# Patient Record
Sex: MALE | Race: WHITE | NOT HISPANIC OR LATINO | URBAN - METROPOLITAN AREA
[De-identification: names, ages, dates, MRNs, and addresses within clinical notes are randomized per-mention and may not be internally consistent; named-entity substitution may affect disease eponyms.]

---

## 2017-10-18 ENCOUNTER — MEDICAL CORRESPONDENCE (OUTPATIENT)
Dept: HEALTH INFORMATION MANAGEMENT | Facility: CLINIC | Age: 67
End: 2017-10-18

## 2017-10-24 ENCOUNTER — OFFICE VISIT (OUTPATIENT)
Dept: OPHTHALMOLOGY | Facility: CLINIC | Age: 67
End: 2017-10-24
Attending: OPHTHALMOLOGY
Payer: COMMERCIAL

## 2017-10-24 DIAGNOSIS — H25.13 NUCLEAR SENILE CATARACT OF BOTH EYES: ICD-10-CM

## 2017-10-24 DIAGNOSIS — H17.9 CORNEAL SCAR: Primary | ICD-10-CM

## 2017-10-24 PROCEDURE — 92025 CPTRIZED CORNEAL TOPOGRAPHY: CPT | Mod: ZF | Performed by: OPHTHALMOLOGY

## 2017-10-24 PROCEDURE — 99213 OFFICE O/P EST LOW 20 MIN: CPT | Mod: 25,ZF

## 2017-10-24 RX ORDER — TAMSULOSIN HYDROCHLORIDE 0.4 MG/1
1 CAPSULE ORAL DAILY
COMMUNITY

## 2017-10-24 ASSESSMENT — SLIT LAMP EXAM - LIDS
COMMENTS: NORMAL
COMMENTS: NORMAL

## 2017-10-24 ASSESSMENT — VISUAL ACUITY
OS_PH_CC: 20/30
CORRECTION_TYPE: GLASSES
OD_CC: 20/20
OS_CC: 20/40
OD_CC+: +2
METHOD: SNELLEN - LINEAR

## 2017-10-24 ASSESSMENT — REFRACTION_WEARINGRX
OS_AXIS: 039
OS_ADD: +2.50
OD_AXIS: 148
OS_CYLINDER: +1.00
SPECS_TYPE: PAL
OD_SPHERE: +0.50
OD_ADD: +2.50
OD_CYLINDER: +0.25
OS_SPHERE: -0.75

## 2017-10-24 ASSESSMENT — TONOMETRY
OD_IOP_MMHG: 20
IOP_METHOD: ICARE
OS_IOP_MMHG: 17

## 2017-10-24 ASSESSMENT — CONF VISUAL FIELD
METHOD: COUNTING FINGERS
OS_NORMAL: 1
OD_NORMAL: 1

## 2017-10-24 ASSESSMENT — EXTERNAL EXAM - LEFT EYE: OS_EXAM: NORMAL

## 2017-10-24 ASSESSMENT — EXTERNAL EXAM - RIGHT EYE: OD_EXAM: NORMAL

## 2017-10-24 NOTE — PROGRESS NOTES
A 66 year M    [facility]    history of getting something in left eye while working in yard 10 yrs prior. He rubbed and rinsed the eye and was diagnosed with a worsening abrasion and subsequent corneal scar. Reports OS was his better eye as OD has astigmatism.    Feels as if he is looking through a dirty window in OS. Constant photophobia. Glare while driving at night. Denies pain, flashes, floaters.    Glasses 2 weeks old.    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     Patient reluctant but willing to to try contact lenses.     Will schedule an appointment with Dr. Ramona SERRATO FOUR TIMES A DAY    Superficial corneal opacity OD  Not visually significant  Can try contact lens in OD as well    Cataract both eyes  Not visually significant, monitor      KAELA Laureano  Cornea fellow        ~~~~~~~~~~~~~~~~~~~~~~~~~~~~~~~~~~~~~~~~~~~~~~~~~~~~~~~~~~~~~~~~    Complete documentation of historical and exam elements from today's encounter can be found in the full encounter summary report (not reduplicated in this progress note). I personally obtained the chief complaint(s) and history of present illness.  I confirmed and edited as necessary the review of systems, past medical/surgical history, family history, social history, and examination findings as documented by others.  I examined the patient myself, and I personally reviewed the relevant tests, images, and reports as documented above. I formulated and edited as necessary the assessment and plan and discussed the findings and management plan with the patient and family.     I personally interpreted the diagnostic /  imaging study and have edited the interpretation as needed.    Tonny Gusman MD, MA  Director, Cornea & Anterior Segment  [facility]

## 2017-10-24 NOTE — MR AVS SNAPSHOT
After Visit Summary   10/24/2017    Luiz Maxwell    MRN: [id number]           Patient Information     Date Of Birth          1950        Visit Information        Provider Department      10/24/2017 12:45 PM Tonny Gusman MD [facility]        Today's Diagnoses     Corneal scar    -  1    Nuclear senile cataract of both eyes           Follow-ups after your visit        Follow-up notes from your care team     Return for Follow Up.      Who to contact     Please call your clinic at [phone] to:    Ask questions about your health    Make or cancel appointments    Discuss your medicines    Learn about your test results    Speak to your doctor   If you have compliments or concerns about an experience at your clinic, or if you wish to file a complaint, please contact [facility] Patient Relations at [phone] or email us at [email or web address]         Additional Information About Your Visit        MyChart Information     Vintt is an electronic gateway that provides easy, online access to your medical records. With Options Away, you can request a clinic appointment, read your test results, renew a prescription or communicate with your care team.     To sign up for Vintt visit the website at [email or web address]   You will be asked to enter the access code listed below, as well as some personal information. Please follow the directions to create your username and password.     Your access code is: [email or web address]  Expires: 2018  6:31 AM     Your access code will  in 90 days. If you need help or a new code, please contact your [facility] or call [phone] for assistance.        Care EveryWhere ID     This is your Care EveryWhere ID. This could be used by other organizations to access your [place] medical records  [id number]         Blood Pressure from Last 3 Encounters:   No data found for BP    Weight  from Last 3 Encounters:   No data found for Wt              We Performed the Following     Corneal Topography OU (both eyes)        Primary Care Provider Fax #    [facility] [phone]       [address] [facility] [address]        Equal Access to Services     RICHARD SUE: Hadii tiffanie ku haderico Soomaali, waaxda luqadaha, qaybta kaalmada adeegyada, grabiel calderonglenn hinklemahoganybetty sue. So [facility] [phone].    ATENCIÓN: Si habla español, tiene a brink disposición servicios gratuitos de asistencia lingüística. Llame al [phone].    We comply with applicable federal civil rights laws and [place] laws. We do not discriminate on the basis of race, color, national origin, age, disability, sex, sexual orientation, or gender identity.            Thank you!     Thank you for choosing EYE CLINIC  for your care. Our goal is always to provide you with excellent care. Hearing back from our patients is one way we can continue to improve our services. Please take a few minutes to complete the written survey that you may receive in the mail after your visit with us. Thank you!             Your Updated Medication List - Protect others around you: Learn how to safely use, store and throw away your medicines at www.disposemymeds.org.          This list is accurate as of: 10/24/17  2:26 PM.  Always use your most recent med list.                   Brand Name Dispense Instructions for use Diagnosis    tamsulosin 0.4 MG capsule    FLOMAX     Take 1 capsule by mouth daily

## 2017-10-24 NOTE — NURSING NOTE
Chief Complaints and History of Present Illnesses   Patient presents with     Corneal Evaluation     Corneal Scarring both eyes.     HPI    Affected eye(s):  Both   Symptoms:     Floaters (Comment: Large floater in RE for 3 years, no changes.)   No flashes   No redness   No Dryness         Do you have eye pain now?:  No      Comments:  Pt states that he had a corneal abrasion in his LE in 2009. Pt states that his vision has not been clear since he had the abrasion. Pt got new glasses 1 week ago but is having difficulty seeing clearly even with glasses.    Amanda GREGG October 24, 2017 12:06 PM

## 2017-10-25 ENCOUNTER — OFFICE VISIT (OUTPATIENT)
Dept: OPTOMETRY | Facility: CLINIC | Age: 67
End: 2017-10-25

## 2017-10-25 DIAGNOSIS — H53.71 GLARE SENSITIVITY: ICD-10-CM

## 2017-10-25 DIAGNOSIS — H52.212 IRREGULAR ASTIGMATISM OF LEFT EYE: Primary | ICD-10-CM

## 2017-10-25 DIAGNOSIS — H17.12 CORNEAL OPACITY, CENTRAL, LEFT: ICD-10-CM

## 2017-10-25 ASSESSMENT — REFRACTION_WEARINGRX
SPECS_TYPE: PAL
OD_SPHERE: +0.50
OD_CYLINDER: +0.25
OD_ADD: +2.50
OS_SPHERE: -0.75
OS_ADD: +2.50
OD_AXIS: 148
OS_CYLINDER: +1.00
OS_AXIS: 039

## 2017-10-25 ASSESSMENT — REFRACTION_CURRENTRX
OS_BRAND: ROSE K2 IC
OD_BRAND: ROSE K2 IC
OS_SPHERE: -1.00
OS_DIAMETER: 11.2
OS_BASECURVE: 41.00
OD_BASECURVE: 40.00
OD_SPHERE: -1.00
OD_DIAMETER: 11.2

## 2017-10-25 ASSESSMENT — SLIT LAMP EXAM - LIDS
COMMENTS: NORMAL
COMMENTS: NORMAL

## 2017-10-25 ASSESSMENT — VISUAL ACUITY
OD_CC: 20/20
METHOD: SNELLEN - LINEAR
CORRECTION_TYPE: GLASSES
OS_CC: 20/30-1

## 2017-10-25 ASSESSMENT — EXTERNAL EXAM - RIGHT EYE: OD_EXAM: NORMAL

## 2017-10-25 ASSESSMENT — EXTERNAL EXAM - LEFT EYE: OS_EXAM: NORMAL

## 2017-10-25 NOTE — MR AVS SNAPSHOT
After Visit Summary   10/25/2017    Luiz Maxwell    MRN: [id number]           Patient Information     Date Of Birth          1950        Visit Information        Provider Department      10/25/2017 11:00 AM Michelle Greene OD [facility]         Follow-ups after your visit        Your next 10 appointments already scheduled     2017 10:30 AM CST   Return Visit with Michelle Greene OD   Eye Clinic ([facility])    [facility]  Fl  Clinic [address]a  [address] [place] [address]   [phone]              Who to contact     Please call your clinic at [phone] to:    Ask questions about your health    Make or cancel appointments    Discuss your medicines    Learn about your test results    Speak to your doctor   If you have compliments or concerns about an experience at your clinic, or if you wish to file a complaint, please contact [facility] Patient Relations at [phone] or email us at [email or web address]         Additional Information About Your Visit        MyChart Information     ascentify is an electronic gateway that provides easy, online access to your medical records. With ascentify, you can request a clinic appointment, read your test results, renew a prescription or communicate with your care team.     To sign up for ascentify visit the website at [email or web address]   You will be asked to enter the access code listed below, as well as some personal information. Please follow the directions to create your username and password.     Your access code is: [email or web address]  Expires: 2018  6:31 AM     Your access code will  in 90 days. If you need help or a new code, please contact your [facility] or call [phone] for assistance.        Care EveryWhere ID     This is your Care EveryWhere ID. This could be used by other organizations to access your  [place] medical records  [id number]         Blood Pressure from Last 3 Encounters:   No data found for BP    Weight from Last 3 Encounters:   No data found for Wt              Today, you had the following     No orders found for display       Primary Care Provider Fax #    [facility] [phone]       [address] [facility] [address]        Equal Access to Services     RICHARD THAO : Hadii aad ku hadasho Soomaali, waaxda luqadaha, qaybta kaalmada adeegyada, waxay idiin hayaan adeeg kharash lazulayn . So [facility] [phone].    ATENCIÓN: Si habla español, tiene a brink disposición servicios gratuitos de asistencia lingüística. Llame al [phone].    We comply with applicable federal civil rights laws and [place] laws. We do not discriminate on the basis of race, color, national origin, age, disability, sex, sexual orientation, or gender identity.            Thank you!     Thank you for choosing EYE CLINIC  for your care. Our goal is always to provide you with excellent care. Hearing back from our patients is one way we can continue to improve our services. Please take a few minutes to complete the written survey that you may receive in the mail after your visit with us. Thank you!             Your Updated Medication List - Protect others around you: Learn how to safely use, store and throw away your medicines at www.disposemymeds.org.          This list is accurate as of: 10/25/17 11:22 AM.  Always use your most recent med list.                   Brand Name Dispense Instructions for use Diagnosis    tamsulosin 0.4 MG capsule    FLOMAX     Take 1 capsule by mouth daily

## 2017-10-25 NOTE — PROGRESS NOTES
A/P  1.) Irregular astigmatism OS 2' to corneal scar  -Improvement in vision/glare with hard lens OS  -Mild astigmatism OD, reading acuity improves with hard lens  -Reviewed findings with pt including lens adaptation and need for OTC readers afterwards  -He would like to proceed. Order lenses OU    RTC 2 weeks for lens dispense, I&R    Contact Lens Billing  V-Code:  - GP Spherical  Final Contact Lens Rx      Brand Base Curve Diameter Sphere Lens   Right Leena K2 IC 8.44 10.0 +1.75 Opt Extra blue dot, Hydra-PEG   Left Leena K2 IC 8.44 10.0 +1.50 Opt Extra blue, Hydra-PEG            # of units: 2  Price per Unit: $150    This patient requires contact lenses that are medically necessary for either improvement in vision over spectacles, support of the ocular surface, or other therapeutic benefit. These are not cosmetic contact lenses.  Photophobia/glare sensitivity    Encounter Diagnoses   Name Primary?     Irregular astigmatism of left eye Yes     Glare sensitivity      Corneal opacity, central, left

## 2017-11-06 ENCOUNTER — OFFICE VISIT (OUTPATIENT)
Dept: OPTOMETRY | Facility: CLINIC | Age: 67
End: 2017-11-06

## 2017-11-06 DIAGNOSIS — H53.71 GLARE SENSITIVITY: ICD-10-CM

## 2017-11-06 DIAGNOSIS — H17.12 CORNEAL OPACITY, CENTRAL, LEFT: ICD-10-CM

## 2017-11-06 DIAGNOSIS — H52.213 IRREGULAR ASTIGMATISM OF BOTH EYES: Primary | ICD-10-CM

## 2017-11-06 RX ORDER — GAS PERM. LENS SOAKING SOLN
1 SOLUTION, NON-ORAL MISCELLANEOUS DAILY
Qty: 1 BOTTLE | Refills: 11 | Status: SHIPPED | OUTPATIENT
Start: 2017-11-06

## 2017-11-06 ASSESSMENT — VISUAL ACUITY
METHOD: SNELLEN - LINEAR
OS_SC: 20/40+1
OD_SC: 20/20-2

## 2017-11-06 ASSESSMENT — EXTERNAL EXAM - LEFT EYE: OS_EXAM: NORMAL

## 2017-11-06 ASSESSMENT — REFRACTION_CURRENTRX
OD_SPHERE: +1.75
OS_SPHERE: +1.50
OD_BASECURVE: 8.44
OD_BRAND: ROSE K2 IC
OS_BASECURVE: 8.44
OS_DIAMETER: 10.0
OD_DIAMETER: 10.0
OS_BRAND: ROSE K2 IC

## 2017-11-06 ASSESSMENT — SLIT LAMP EXAM - LIDS
COMMENTS: NORMAL
COMMENTS: NORMAL

## 2017-11-06 ASSESSMENT — EXTERNAL EXAM - RIGHT EYE: OD_EXAM: NORMAL

## 2017-11-06 NOTE — PROGRESS NOTES
A/P  1.) Irregular astigmatism OS 2' to corneal scar  -Improvement in vision/glare with hard lens OS  -Mild astigmatism OD, reading acuity improves with hard lens  -Significant lid attachment causing fluctuating vision with GP's  -Tear film likely to be limitation despite Hydra-PEG  -Successful I&R, reviewed CL care and hygiene with pt ([place] [facility])    RTC 2 week f/u, will likely need larger diam GP's

## 2017-11-06 NOTE — MR AVS SNAPSHOT
After Visit Summary   2017    Luiz Maxwell    MRN: [id number]           Patient Information     Date Of Birth          1950        Visit Information        Provider Department      2017 10:30 AM Michelle Greene, [facility]        Today's Diagnoses     Irregular astigmatism of both eyes    -  1       Follow-ups after your visit        Your next 10 appointments already scheduled     2017  7:30 AM CST   Return Visit with Michelle Greene OD   Eye Clinic ([facility])    [facility]  Fl  Clinic [address]a  [address] [facility] [address]   [phone]              Who to contact     Please call your clinic at [phone] to:    Ask questions about your health    Make or cancel appointments    Discuss your medicines    Learn about your test results    Speak to your doctor   If you have compliments or concerns about an experience at your clinic, or if you wish to file a complaint, please contact [facility] Patient Relations at [phone] or email us at [email or web address]         Additional Information About Your Visit        MyChart Information     [employer] is an electronic gateway that provides easy, online access to your medical records. With [employer], you can request a clinic appointment, read your test results, renew a prescription or communicate with your care team.     To sign up for [employer] visit the website at [email or web address]   You will be asked to enter the access code listed below, as well as some personal information. Please follow the directions to create your username and password.     Your access code is: [email or web address]  Expires: 2018  5:31 AM     Your access code will  in 90 days. If you need help or a new code, please contact your [facility] or call [phone] for assistance.        Care EveryWhere ID     This is your Care  EveryWhere ID. This could be used by other organizations to access your [place] medical records  [id number]         Blood Pressure from Last 3 Encounters:   No data found for BP    Weight from Last 3 Encounters:   No data found for Wt              Today, you had the following     No orders found for display         Today's Medication Changes          These changes are accurate as of: 11/6/17 10:49 AM.  If you have any questions, ask your nurse or doctor.               Start taking these medicines.        Dose/Directions    JEFFRY SIMPLUS Soln   Used for:  Irregular astigmatism of both eyes        Dose:  1 Application   1 Application daily   Quantity:  1 Bottle   Refills:  11            Where to get your medicines      Some of these will need a paper prescription and others can be bought over the counter.  Ask your nurse if you have questions.     Bring a paper prescription for each of these medications     BOSTON SIMPLUS Soln                Primary Care Provider Fax #    [facility] [phone]       [address] [facility] [address]        Equal Access to Services     RICHARD THAO : Hadii aad ku hadasho Sokei, waaxda luqadaha, qaybta kaalmada adeegyada, grabiel ramachandran hayalyse de la fuente . So [facility] [phone].    ATENCIÓN: Si habla español, tiene a brink disposición servicios gratuitos de asistencia lingüística. Llame al [phone].    We comply with applicable federal civil rights laws and [place] laws. We do not discriminate on the basis of race, color, national origin, age, disability, sex, sexual orientation, or gender identity.            Thank you!     Thank you for choosing EYE CLINIC  for your care. Our goal is always to provide you with excellent care. Hearing back from our patients is one way we can continue to improve our services. Please take a few minutes to complete the written survey that you may receive in the mail after your visit with us. Thank you!              Your Updated Medication List - Protect others around you: Learn how to safely use, store and throw away your medicines at www.disposemymeds.org.          This list is accurate as of: 11/6/17 10:49 AM.  Always use your most recent med list.                   Brand Name Dispense Instructions for use Diagnosis    BOSTON SIMPLUS Soln     1 Bottle    1 Application daily    Irregular astigmatism of both eyes       tamsulosin 0.4 MG capsule    FLOMAX     Take 1 capsule by mouth daily

## 2022-04-08 ENCOUNTER — APPOINTMENT (OUTPATIENT)
Dept: RADIOLOGY | Facility: MEDICAL CENTER | Age: 72
End: 2022-04-08
Attending: EMERGENCY MEDICINE
Payer: MEDICARE

## 2022-04-08 ENCOUNTER — HOSPITAL ENCOUNTER (EMERGENCY)
Facility: MEDICAL CENTER | Age: 72
End: 2022-04-09
Attending: EMERGENCY MEDICINE
Payer: MEDICARE

## 2022-04-08 DIAGNOSIS — R07.9 CHEST PAIN, UNSPECIFIED TYPE: ICD-10-CM

## 2022-04-08 DIAGNOSIS — R45.851 SUICIDAL IDEATION: ICD-10-CM

## 2022-04-08 DIAGNOSIS — J44.9 CHRONIC OBSTRUCTIVE PULMONARY DISEASE, UNSPECIFIED COPD TYPE (HCC): ICD-10-CM

## 2022-04-08 LAB
ALBUMIN SERPL BCP-MCNC: 3.8 G/DL (ref 3.2–4.9)
ALBUMIN/GLOB SERPL: 1.4 G/DL
ALP SERPL-CCNC: 116 U/L (ref 30–99)
ALT SERPL-CCNC: 20 U/L (ref 2–50)
AMPHET UR QL SCN: NEGATIVE
ANION GAP SERPL CALC-SCNC: 14 MMOL/L (ref 7–16)
AST SERPL-CCNC: 35 U/L (ref 12–45)
BARBITURATES UR QL SCN: NEGATIVE
BASOPHILS # BLD AUTO: 0.4 % (ref 0–1.8)
BASOPHILS # BLD: 0.03 K/UL (ref 0–0.12)
BENZODIAZ UR QL SCN: POSITIVE
BILIRUB SERPL-MCNC: 0.5 MG/DL (ref 0.1–1.5)
BUN SERPL-MCNC: 6 MG/DL (ref 8–22)
BZE UR QL SCN: NEGATIVE
CALCIUM SERPL-MCNC: 8.7 MG/DL (ref 8.5–10.5)
CANNABINOIDS UR QL SCN: NEGATIVE
CHLORIDE SERPL-SCNC: 102 MMOL/L (ref 96–112)
CO2 SERPL-SCNC: 22 MMOL/L (ref 20–33)
CREAT SERPL-MCNC: 0.57 MG/DL (ref 0.5–1.4)
D DIMER PPP IA.FEU-MCNC: 2.83 UG/ML (FEU) (ref 0–0.5)
EKG IMPRESSION: NORMAL
EKG IMPRESSION: NORMAL
EOSINOPHIL # BLD AUTO: 0.25 K/UL (ref 0–0.51)
EOSINOPHIL NFR BLD: 3 % (ref 0–6.9)
ERYTHROCYTE [DISTWIDTH] IN BLOOD BY AUTOMATED COUNT: 51.6 FL (ref 35.9–50)
ETHANOL BLD-MCNC: 35.5 MG/DL (ref 0–10)
GFR SERPLBLD CREATININE-BSD FMLA CKD-EPI: 106 ML/MIN/1.73 M 2
GLOBULIN SER CALC-MCNC: 2.7 G/DL (ref 1.9–3.5)
GLUCOSE SERPL-MCNC: 84 MG/DL (ref 65–99)
HCT VFR BLD AUTO: 35.7 % (ref 42–52)
HGB BLD-MCNC: 12 G/DL (ref 14–18)
IMM GRANULOCYTES # BLD AUTO: 0.02 K/UL (ref 0–0.11)
IMM GRANULOCYTES NFR BLD AUTO: 0.2 % (ref 0–0.9)
LACTATE BLD-SCNC: 1.8 MMOL/L (ref 0.5–2)
LACTATE BLD-SCNC: 2.2 MMOL/L (ref 0.5–2)
LYMPHOCYTES # BLD AUTO: 0.95 K/UL (ref 1–4.8)
LYMPHOCYTES NFR BLD: 11.2 % (ref 22–41)
MCH RBC QN AUTO: 31.3 PG (ref 27–33)
MCHC RBC AUTO-ENTMCNC: 33.6 G/DL (ref 33.7–35.3)
MCV RBC AUTO: 93 FL (ref 81.4–97.8)
METHADONE UR QL SCN: NEGATIVE
MONOCYTES # BLD AUTO: 0.67 K/UL (ref 0–0.85)
MONOCYTES NFR BLD AUTO: 7.9 % (ref 0–13.4)
NEUTROPHILS # BLD AUTO: 6.53 K/UL (ref 1.82–7.42)
NEUTROPHILS NFR BLD: 77.3 % (ref 44–72)
NRBC # BLD AUTO: 0 K/UL
NRBC BLD-RTO: 0 /100 WBC
OPIATES UR QL SCN: NEGATIVE
OXYCODONE UR QL SCN: NEGATIVE
PCP UR QL SCN: NEGATIVE
PLATELET # BLD AUTO: 179 K/UL (ref 164–446)
PMV BLD AUTO: 9.3 FL (ref 9–12.9)
POC BREATHALIZER: 0 PERCENT (ref 0–0.01)
POTASSIUM SERPL-SCNC: 4 MMOL/L (ref 3.6–5.5)
PROCALCITONIN SERPL-MCNC: <0.05 NG/ML
PROPOXYPH UR QL SCN: NEGATIVE
PROT SERPL-MCNC: 6.5 G/DL (ref 6–8.2)
RBC # BLD AUTO: 3.84 M/UL (ref 4.7–6.1)
SODIUM SERPL-SCNC: 138 MMOL/L (ref 135–145)
TROPONIN T SERPL-MCNC: 11 NG/L (ref 6–19)
TROPONIN T SERPL-MCNC: 9 NG/L (ref 6–19)
WBC # BLD AUTO: 8.5 K/UL (ref 4.8–10.8)

## 2022-04-08 PROCEDURE — 87040 BLOOD CULTURE FOR BACTERIA: CPT | Mod: 91

## 2022-04-08 PROCEDURE — 99285 EMERGENCY DEPT VISIT HI MDM: CPT

## 2022-04-08 PROCEDURE — 83605 ASSAY OF LACTIC ACID: CPT

## 2022-04-08 PROCEDURE — A9270 NON-COVERED ITEM OR SERVICE: HCPCS | Performed by: EMERGENCY MEDICINE

## 2022-04-08 PROCEDURE — 700102 HCHG RX REV CODE 250 W/ 637 OVERRIDE(OP): Performed by: EMERGENCY MEDICINE

## 2022-04-08 PROCEDURE — 700117 HCHG RX CONTRAST REV CODE 255: Performed by: EMERGENCY MEDICINE

## 2022-04-08 PROCEDURE — 84145 PROCALCITONIN (PCT): CPT

## 2022-04-08 PROCEDURE — 80053 COMPREHEN METABOLIC PANEL: CPT

## 2022-04-08 PROCEDURE — 82077 ASSAY SPEC XCP UR&BREATH IA: CPT

## 2022-04-08 PROCEDURE — 71275 CT ANGIOGRAPHY CHEST: CPT | Mod: ME

## 2022-04-08 PROCEDURE — 84484 ASSAY OF TROPONIN QUANT: CPT

## 2022-04-08 PROCEDURE — 80307 DRUG TEST PRSMV CHEM ANLYZR: CPT

## 2022-04-08 PROCEDURE — 93005 ELECTROCARDIOGRAM TRACING: CPT

## 2022-04-08 PROCEDURE — 85025 COMPLETE CBC W/AUTO DIFF WBC: CPT

## 2022-04-08 PROCEDURE — 85379 FIBRIN DEGRADATION QUANT: CPT

## 2022-04-08 PROCEDURE — 36415 COLL VENOUS BLD VENIPUNCTURE: CPT

## 2022-04-08 PROCEDURE — 302970 POC BREATHALIZER: Performed by: EMERGENCY MEDICINE

## 2022-04-08 PROCEDURE — 96374 THER/PROPH/DIAG INJ IV PUSH: CPT | Mod: XU

## 2022-04-08 PROCEDURE — 93005 ELECTROCARDIOGRAM TRACING: CPT | Performed by: EMERGENCY MEDICINE

## 2022-04-08 PROCEDURE — 700111 HCHG RX REV CODE 636 W/ 250 OVERRIDE (IP): Performed by: EMERGENCY MEDICINE

## 2022-04-08 PROCEDURE — 71045 X-RAY EXAM CHEST 1 VIEW: CPT

## 2022-04-08 PROCEDURE — 90791 PSYCH DIAGNOSTIC EVALUATION: CPT

## 2022-04-08 RX ORDER — ALBUTEROL SULFATE 90 UG/1
2 AEROSOL, METERED RESPIRATORY (INHALATION) EVERY 4 HOURS PRN
Status: DISCONTINUED | OUTPATIENT
Start: 2022-04-08 | End: 2022-04-09 | Stop reason: HOSPADM

## 2022-04-08 RX ORDER — TRAZODONE HYDROCHLORIDE 50 MG/1
50 TABLET ORAL
Status: DISCONTINUED | OUTPATIENT
Start: 2022-04-08 | End: 2022-04-09 | Stop reason: HOSPADM

## 2022-04-08 RX ORDER — CHOLECALCIFEROL (VITAMIN D3) 125 MCG
5 CAPSULE ORAL NIGHTLY
Status: DISCONTINUED | OUTPATIENT
Start: 2022-04-08 | End: 2022-04-09 | Stop reason: HOSPADM

## 2022-04-08 RX ORDER — PREDNISONE 20 MG/1
60 TABLET ORAL DAILY
Status: DISCONTINUED | OUTPATIENT
Start: 2022-04-08 | End: 2022-04-09 | Stop reason: HOSPADM

## 2022-04-08 RX ADMIN — ALBUTEROL SULFATE 2 PUFF: 90 AEROSOL, METERED RESPIRATORY (INHALATION) at 14:05

## 2022-04-08 RX ADMIN — TRAZODONE HYDROCHLORIDE 50 MG: 50 TABLET ORAL at 20:52

## 2022-04-08 RX ADMIN — Medication 5 MG: at 20:52

## 2022-04-08 RX ADMIN — IOHEXOL 75 ML: 350 INJECTION, SOLUTION INTRAVENOUS at 11:41

## 2022-04-08 RX ADMIN — PREDNISONE 60 MG: 20 TABLET ORAL at 14:04

## 2022-04-08 RX ADMIN — FENTANYL CITRATE 50 MCG: 50 INJECTION, SOLUTION INTRAMUSCULAR; INTRAVENOUS at 09:39

## 2022-04-08 NOTE — ED PROVIDER NOTES
"ED Provider Note    ER PROVIDER NOTE          CHIEF COMPLAINT  Chief Complaint   Patient presents with   • Chest Pain     CP Started about an hour ago. Sharp pain from r lower side of chest to left side. Per pt \"left arm went numb\". Pain worsens during inspiration.       HPI  Mohsen Adler is a 71 y.o. male who presents to the emergency department complaining of chest pain.  Patient reports that he has had gradual onset of left-sided chest pain around 1 hour ago while he was walking and \"looking for a bathroom\".  The pain is sharp, does not radiate, it is worse with a deep breath but does not seem to change with exertion or position.  It is currently mild in nature.  He does state that he had some tingling in his arm as well although this is gone.  He does report some cough as well as shortness of breath over the last few days.  States he took a Covid test that was negative.  He reports no fevers or chills.  No leg pain or swelling.  No abdominal pain, nausea, vomiting or diaphoresis    REVIEW OF SYSTEMS  Pertinent positives include chest pain. Pertinent negatives include no fever. See HPI for details. All other systems reviewed and are negative.    PAST MEDICAL HISTORY   has a past medical history of Chronic obstructive pulmonary disease (HCC).    SURGICAL HISTORY  patient denies any surgical history    FAMILY HISTORY  History reviewed. No pertinent family history.    SOCIAL HISTORY  Social History     Socioeconomic History   • Marital status: Single   Tobacco Use   • Smoking status: Current Every Day Smoker     Packs/day: 0.50     Types: Cigarettes   • Smokeless tobacco: Never Used   Vaping Use   • Vaping Use: Never used   Substance and Sexual Activity   • Alcohol use: Yes     Comment: 1 pint of vodka per day   • Drug use: Never      Social History     Substance and Sexual Activity   Drug Use Never       CURRENT MEDICATIONS  Home Medications     Reviewed by Hany Benitez R.N. (Registered Nurse) on 04/08/22 at " "0750  Med List Status: Partial   Medication Last Dose Status        Patient Nehemiah Taking any Medications                       ALLERGIES  Allergies   Allergen Reactions   • Seroquel [Quetiapine]        PHYSICAL EXAM  VITAL SIGNS: /72   Pulse (!) 57   Temp 37.7 °C (99.8 °F) (Temporal)   Resp 15   Ht 1.753 m (5' 9\")   Wt 81.6 kg (180 lb)   SpO2 91%   BMI 26.58 kg/m²   Pulse ox interpretation: I interpret this pulse ox as normal.    Constitutional: Alert in no apparent distress.  HENT: No signs of trauma, Bilateral external ears normal, Nose normal.   Eyes: Pupils are equal and reactive, Conjunctiva normal, Non-icteric.   Neck: Normal range of motion, No tenderness, Supple, No stridor.    Cardiovascular: Regular rate and rhythm, no murmurs.   Thorax & Lungs: Scattered wheezing, No respiratory distress,  left sided chest wall tenderness  Abdomen: Bowel sounds normal, Soft, No tenderness, No masses, No pulsatile masses. No peritoneal signs.  Skin: Warm, Dry, No erythema, No rash.   Back: No bony tenderness, No CVA tenderness.   Extremities: Intact distal pulses, No edema, No tenderness, No cyanosis, Negative Kiki's sign.  Musculoskeletal: Good range of motion in all major joints. No tenderness to palpation or major deformities noted.   Neurologic: Alert , Normal motor function, Normal sensory function, No focal deficits noted.   Psychiatric: Affect normal, Judgment normal, Mood normal.     DIAGNOSTIC STUDIES / PROCEDURES    Results for orders placed or performed during the hospital encounter of 04/08/22   CBC WITH DIFFERENTIAL   Result Value Ref Range    WBC 8.5 4.8 - 10.8 K/uL    RBC 3.84 (L) 4.70 - 6.10 M/uL    Hemoglobin 12.0 (L) 14.0 - 18.0 g/dL    Hematocrit 35.7 (L) 42.0 - 52.0 %    MCV 93.0 81.4 - 97.8 fL    MCH 31.3 27.0 - 33.0 pg    MCHC 33.6 (L) 33.7 - 35.3 g/dL    RDW 51.6 (H) 35.9 - 50.0 fL    Platelet Count 179 164 - 446 K/uL    MPV 9.3 9.0 - 12.9 fL    Neutrophils-Polys 77.30 (H) 44.00 - 72.00 " %    Lymphocytes 11.20 (L) 22.00 - 41.00 %    Monocytes 7.90 0.00 - 13.40 %    Eosinophils 3.00 0.00 - 6.90 %    Basophils 0.40 0.00 - 1.80 %    Immature Granulocytes 0.20 0.00 - 0.90 %    Nucleated RBC 0.00 /100 WBC    Neutrophils (Absolute) 6.53 1.82 - 7.42 K/uL    Lymphs (Absolute) 0.95 (L) 1.00 - 4.80 K/uL    Monos (Absolute) 0.67 0.00 - 0.85 K/uL    Eos (Absolute) 0.25 0.00 - 0.51 K/uL    Baso (Absolute) 0.03 0.00 - 0.12 K/uL    Immature Granulocytes (abs) 0.02 0.00 - 0.11 K/uL    NRBC (Absolute) 0.00 K/uL   COMP METABOLIC PANEL   Result Value Ref Range    Sodium 138 135 - 145 mmol/L    Potassium 4.0 3.6 - 5.5 mmol/L    Chloride 102 96 - 112 mmol/L    Co2 22 20 - 33 mmol/L    Anion Gap 14.0 7.0 - 16.0    Glucose 84 65 - 99 mg/dL    Bun 6 (L) 8 - 22 mg/dL    Creatinine 0.57 0.50 - 1.40 mg/dL    Calcium 8.7 8.5 - 10.5 mg/dL    AST(SGOT) 35 12 - 45 U/L    ALT(SGPT) 20 2 - 50 U/L    Alkaline Phosphatase 116 (H) 30 - 99 U/L    Total Bilirubin 0.5 0.1 - 1.5 mg/dL    Albumin 3.8 3.2 - 4.9 g/dL    Total Protein 6.5 6.0 - 8.2 g/dL    Globulin 2.7 1.9 - 3.5 g/dL    A-G Ratio 1.4 g/dL   TROPONIN   Result Value Ref Range    Troponin T 11 6 - 19 ng/L   D-DIMER   Result Value Ref Range    D-Dimer Screen 2.83 (H) 0.00 - 0.50 ug/mL (FEU)   Lactic Acid   Result Value Ref Range    Lactic Acid 2.2 (H) 0.5 - 2.0 mmol/L   Lactic Acid   Result Value Ref Range    Lactic Acid 1.8 0.5 - 2.0 mmol/L   Procalcitonin   Result Value Ref Range    Procalcitonin <0.05 <0.25 ng/mL   ESTIMATED GFR   Result Value Ref Range    GFR (CKD-EPI) 106 >60 mL/min/1.73 m 2   TROPONIN   Result Value Ref Range    Troponin T 9 6 - 19 ng/L   EKG   Result Value Ref Range    Report       [facility]    Test Date:  2022  Pt Name:    ADRIEN FIGUEROA               Department: ER  MRN:        [id number]                      Room:        11  Gender:     Male                         Technician: [id number]  :        1950                    Requested By:ER TRIAGE PROTOCOL  Order #:    [id number]                    Reading MD: FRANSISCA JENSEN MD    Measurements  Intervals                                Axis  Rate:       82                           P:          58  OR:         142                          QRS:        62  QRSD:       84                           T:          65  QT:         371  QTc:        434    Interpretive Statements  Sinus rhythm  Borderline ST elevation, inferior leads  No previous ECG available for comparison  Electronically Signed On 2022 7:49:28 PDT by FRANSISCA JENSEN MD     EKG (Now)   Result Value Ref Range    Report       [facility] Emergency Dept.    Test Date:  2022  Pt Name:    ADRIEN FIGUEROA               Department: ER  MRN:        [id number]                      Room:        11  Gender:     Male                         Technician: [id number]  :        1950                   Requested By:FRANSISCA JENSEN  Order #:    [id number]                    Reading MD: FRANSISCA JENSEN MD    Measurements  Intervals                                Axis  Rate:       51                           P:          37  OR:         197                          QRS:        55  QRSD:       89                           T:          59  QT:         394  QTc:        363    Interpretive Statements  Sinus bradycardia  Minimal ST elevation, inferior leads  Compared to ECG 2022 07:43:40  Sinus rhythm no longer present  ST (T wave) deviation still present  Electronically Signed On 2022 10:23:14 PDT by FRANSISCA JENSEN MD           RADIOLOGY  CT-CTA CHEST PULMONARY ARTERY W/ RECONS   Final Result      1.  No CT evidence for pulmonary emboli.   2.  Minimal RIGHT pleural fluid.   3.  Bilateral dependent atelectasis.            DX-CHEST-PORTABLE (1 VIEW)   Final Result      1.  Mild-to-moderate elevation left hemidiaphragm.      2.  Diffuse interstitial parenchymal scarring.      3.  Parenchymal scar versus  atelectasis left midlung.        The radiologist's interpretation of all radiological studies have been reviewed and images independently viewed by me.    COURSE & MEDICAL DECISION MAKING  Nursing notes, VS, PMSFHx reviewed in chart.    7:48 AM Patient seen and examined at bedside. Ordered for ECG, CBC, CMP, troponin, dimer, procalcitonin and x-ray to evaluate his symptoms.     Plan for repeat/serial ECG as well     9:15 AM  Patient is reevaluated, updated on results thus far, pending CTA    11:45 AM patient has returned from CT, pending results, he is resting comfortably    12:05 PM  Patient is reevaluated, updated on results of CTA.  Patient reports that he is feeling suicidal.  States that he will cut his femoral artery to kill himself once he leaves the hospital    We will initiate legal hold, patient is admitted to ED observation at 12:05 PM on 4/8/22 for continued mental health evaluation and potential inpatient psychiatric care        Decision Making:  This is a 71 y.o. male presenting with chest pain as well as suicidal ideation.  Regarding the patient's chest pain does not appear to have any acute or emergent pathology behind it.  Would be atypical for ACS, has negative troponin x2 as well as ECG that is unchanged.  Given the pleuritic nature of his pain I did obtain a D-dimer which is elevated and CTA was obtained which shows no evidence of pulmonary embolism, also without evidence of pneumonia dissection or other thoracic pathology.  Patient does have some scattered wheezing and with history of COPD, will start on prednisone and albuterol as needed.  He is not hypoxemic or any findings of respiratory distress at this time.  However patient is acutely suicidal      He has been medically cleared regarding his chest pain is legal hold is initiated given his clear plan and hopelessness and will plan for inpatient psychiatric evaluation patient's care will be signed over to the oncoming emergency physician  pending Continued mental health care    FINAL IMPRESSION  1. Chest pain, unspecified type    2. Chronic obstructive pulmonary disease, unspecified COPD type (HCC)    3. Suicidal ideation          The note accurately reflects work and decisions made by me.  Hany Phoenix M.D.  4/8/2022  1:51 PM

## 2022-04-08 NOTE — ED TRIAGE NOTES
"Chief Complaint   Patient presents with   • Chest Pain     CP Started about an hour ago. Sharp pain from r lower side of chest to left side. Per pt \"left arm went numb\". Pain worsens during inspiration.     /79   Pulse 60   Temp 37.7 °C (99.8 °F) (Temporal)   Resp 18   Ht 1.753 m (5' 9\")   Wt 81.6 kg (180 lb)   SpO2 93%   BMI 26.58 kg/m²     "

## 2022-04-09 VITALS
WEIGHT: 180 LBS | RESPIRATION RATE: 18 BRPM | HEIGHT: 69 IN | OXYGEN SATURATION: 93 % | TEMPERATURE: 98.5 F | BODY MASS INDEX: 26.66 KG/M2 | DIASTOLIC BLOOD PRESSURE: 73 MMHG | HEART RATE: 73 BPM | SYSTOLIC BLOOD PRESSURE: 129 MMHG

## 2022-04-09 LAB
EKG IMPRESSION: NORMAL
SARS-COV+SARS-COV-2 AG RESP QL IA.RAPID: NOTDETECTED
SPECIMEN SOURCE: NORMAL

## 2022-04-09 PROCEDURE — 700102 HCHG RX REV CODE 250 W/ 637 OVERRIDE(OP): Performed by: EMERGENCY MEDICINE

## 2022-04-09 PROCEDURE — 700111 HCHG RX REV CODE 636 W/ 250 OVERRIDE (IP): Performed by: EMERGENCY MEDICINE

## 2022-04-09 PROCEDURE — A9270 NON-COVERED ITEM OR SERVICE: HCPCS

## 2022-04-09 PROCEDURE — 87426 SARSCOV CORONAVIRUS AG IA: CPT

## 2022-04-09 PROCEDURE — 93005 ELECTROCARDIOGRAM TRACING: CPT

## 2022-04-09 PROCEDURE — A9270 NON-COVERED ITEM OR SERVICE: HCPCS | Performed by: EMERGENCY MEDICINE

## 2022-04-09 PROCEDURE — 700102 HCHG RX REV CODE 250 W/ 637 OVERRIDE(OP)

## 2022-04-09 RX ORDER — IBUPROFEN 600 MG/1
600 TABLET ORAL ONCE
Status: COMPLETED | OUTPATIENT
Start: 2022-04-09 | End: 2022-04-09

## 2022-04-09 RX ORDER — HYDROXYZINE HYDROCHLORIDE 25 MG/1
25 TABLET, FILM COATED ORAL EVERY 6 HOURS PRN
Status: DISCONTINUED | OUTPATIENT
Start: 2022-04-09 | End: 2022-04-09 | Stop reason: HOSPADM

## 2022-04-09 RX ADMIN — HYDROXYZINE HYDROCHLORIDE 25 MG: 25 TABLET, FILM COATED ORAL at 18:06

## 2022-04-09 RX ADMIN — ALBUTEROL SULFATE 2 PUFF: 90 AEROSOL, METERED RESPIRATORY (INHALATION) at 08:18

## 2022-04-09 RX ADMIN — PREDNISONE 60 MG: 20 TABLET ORAL at 06:00

## 2022-04-09 RX ADMIN — IBUPROFEN 600 MG: 600 TABLET ORAL at 10:28

## 2022-04-09 RX ADMIN — ALBUTEROL SULFATE 2 PUFF: 90 AEROSOL, METERED RESPIRATORY (INHALATION) at 16:26

## 2022-04-09 ASSESSMENT — PAIN DESCRIPTION - PAIN TYPE: TYPE: ACUTE PAIN;CHRONIC PAIN

## 2022-04-09 NOTE — PROGRESS NOTES
"ED Provider Progress Note    ED Observation Progress Note    Date of Service: 04/09/22    Interval History  0600 - Patient reevaluated.  Patient is on a legal hold, waiting transfer to psychiatric facility when a bed is available.  Please refer to initial note for complete details.  No concerns overnight.  Patient ambulates to the bathroom independently and tolerated a meal.  Medication reconciliation has been reviewed.  No medications to renew now, will have pharmacy review.    10:22 AM patient complaining of right-sided chest pain.  Previous work-up was unrevealing with 2 - troponins, normal chest x-ray.  Atypical for ACS.  Repeat troponin is unchanged.  No ischemia or STEMI.  Will treat nonspecific pain with Tylenol/ibuprofen.    Physical Exam  /84   Pulse (!) 55   Temp 37.7 °C (99.8 °F) (Temporal)   Resp 18   Ht 1.753 m (5' 9\")   Wt 81.6 kg (180 lb)   SpO2 94%   BMI 26.58 kg/m² .    Constitutional: Awake and alert. Nontoxic  HENT:  Grossly normal  Eyes: Grossly normal  Neck: Normal range of motion  Cardiovascular: Normal peripheral perfusion.  Thorax & Lungs: Nonlabored respirations.  Skin: Warm, dry  Extremities: No deformities noted  Psychiatric: Flat affect    Labs  Results for orders placed or performed during the hospital encounter of 04/08/22   CBC WITH DIFFERENTIAL   Result Value Ref Range    WBC 8.5 4.8 - 10.8 K/uL    RBC 3.84 (L) 4.70 - 6.10 M/uL    Hemoglobin 12.0 (L) 14.0 - 18.0 g/dL    Hematocrit 35.7 (L) 42.0 - 52.0 %    MCV 93.0 81.4 - 97.8 fL    MCH 31.3 27.0 - 33.0 pg    MCHC 33.6 (L) 33.7 - 35.3 g/dL    RDW 51.6 (H) 35.9 - 50.0 fL    Platelet Count 179 164 - 446 K/uL    MPV 9.3 9.0 - 12.9 fL    Neutrophils-Polys 77.30 (H) 44.00 - 72.00 %    Lymphocytes 11.20 (L) 22.00 - 41.00 %    Monocytes 7.90 0.00 - 13.40 %    Eosinophils 3.00 0.00 - 6.90 %    Basophils 0.40 0.00 - 1.80 %    Immature Granulocytes 0.20 0.00 - 0.90 %    Nucleated RBC 0.00 /100 WBC    Neutrophils (Absolute) 6.53 " 1.82 - 7.42 K/uL    Lymphs (Absolute) 0.95 (L) 1.00 - 4.80 K/uL    Monos (Absolute) 0.67 0.00 - 0.85 K/uL    Eos (Absolute) 0.25 0.00 - 0.51 K/uL    Baso (Absolute) 0.03 0.00 - 0.12 K/uL    Immature Granulocytes (abs) 0.02 0.00 - 0.11 K/uL    NRBC (Absolute) 0.00 K/uL   COMP METABOLIC PANEL   Result Value Ref Range    Sodium 138 135 - 145 mmol/L    Potassium 4.0 3.6 - 5.5 mmol/L    Chloride 102 96 - 112 mmol/L    Co2 22 20 - 33 mmol/L    Anion Gap 14.0 7.0 - 16.0    Glucose 84 65 - 99 mg/dL    Bun 6 (L) 8 - 22 mg/dL    Creatinine 0.57 0.50 - 1.40 mg/dL    Calcium 8.7 8.5 - 10.5 mg/dL    AST(SGOT) 35 12 - 45 U/L    ALT(SGPT) 20 2 - 50 U/L    Alkaline Phosphatase 116 (H) 30 - 99 U/L    Total Bilirubin 0.5 0.1 - 1.5 mg/dL    Albumin 3.8 3.2 - 4.9 g/dL    Total Protein 6.5 6.0 - 8.2 g/dL    Globulin 2.7 1.9 - 3.5 g/dL    A-G Ratio 1.4 g/dL   TROPONIN   Result Value Ref Range    Troponin T 11 6 - 19 ng/L   D-DIMER   Result Value Ref Range    D-Dimer Screen 2.83 (H) 0.00 - 0.50 ug/mL (FEU)   Lactic Acid   Result Value Ref Range    Lactic Acid 2.2 (H) 0.5 - 2.0 mmol/L   Lactic Acid   Result Value Ref Range    Lactic Acid 1.8 0.5 - 2.0 mmol/L   Procalcitonin   Result Value Ref Range    Procalcitonin <0.05 <0.25 ng/mL   ESTIMATED GFR   Result Value Ref Range    GFR (CKD-EPI) 106 >60 mL/min/1.73 m 2   TROPONIN   Result Value Ref Range    Troponin T 9 6 - 19 ng/L   Urine Drug Screen   Result Value Ref Range    Amphetamines Urine Negative Negative    Barbiturates Negative Negative    Benzodiazepines Positive (A) Negative    Cocaine Metabolite Negative Negative    Methadone Negative Negative    Opiates Negative Negative    Oxycodone Negative Negative    Phencyclidine -Pcp Negative Negative    Propoxyphene Negative Negative    Cannabinoid Metab Negative Negative   DIAGNOSTIC ALCOHOL (BA)   Result Value Ref Range    Diagnostic Alcohol 35.5 (H) 0.0 - 10.0 mg/dL   POC BREATHALIZER   Result Value Ref Range    POC Breathalizer 0.00  0.00 - 0.01 Percent   EKG   Result Value Ref Range    Report       [facility] Emergency Dept.    Test Date:  2022  Pt Name:    ADRIEN FIGUEROA               Department: ER  MRN:        [id number]                      Room:       RD 11  Gender:     Male                         Technician: [id number]  :        1950                   Requested By:ER TRIAGE PROTOCOL  Order #:    [id number]                    Reading MD: FRANSISCA JENSEN MD    Measurements  Intervals                                Axis  Rate:       82                           P:          58  AR:         142                          QRS:        62  QRSD:       84                           T:          65  QT:         371  QTc:        434    Interpretive Statements  Sinus rhythm  Borderline ST elevation, inferior leads  No previous ECG available for comparison  Electronically Signed On 2022 7:49:28 PDT by FRANSISCA JENSEN MD     EKG (Now)   Result Value Ref Range    Report       [facility] Emergency Dept.    Test Date:  2022  Pt Name:    ADRIEN FIGUEROA               Department: ER  MRN:        [id number]                      Room:       RD 11  Gender:     Male                         Technician: [id number]  :        1950                   Requested By:FRANSISCA JENSEN  Order #:    [id number]                    Reading MD: FRANSISCA JENSEN MD    Measurements  Intervals                                Axis  Rate:       51                           P:          37  AR:         197                          QRS:        55  QRSD:       89                           T:          59  QT:         394  QTc:        363    Interpretive Statements  Sinus bradycardia  Minimal ST elevation, inferior leads  Compared to ECG 2022 07:43:40  Sinus rhythm no longer present  ST (T wave) deviation still present  Electronically Signed On 2022 10:23:14 PDT by FRANSISCA JENSEN MD         Radiology  CT-CTA CHEST PULMONARY  ARTERY W/ RECONS   Final Result      1.  No CT evidence for pulmonary emboli.   2.  Minimal RIGHT pleural fluid.   3.  Bilateral dependent atelectasis.            DX-CHEST-PORTABLE (1 VIEW)   Final Result      1.  Mild-to-moderate elevation left hemidiaphragm.      2.  Diffuse interstitial parenchymal scarring.      3.  Parenchymal scar versus atelectasis left midlung.          Problem List  1.  Suicidal ideation: Awaiting transfer to psychiatric facility when a bed is available.        Electronically signed by: Merry Lucia D.O., 4/9/2022 5:40 AM

## 2022-04-09 NOTE — ED NOTES
Denture cup with adhesive and  provided per pt request. Pt resting in NAD. 1:1 sitter in direct view of patient.

## 2022-04-09 NOTE — ED NOTES
COVID swab collected and sent to lab in anticipation of possible transfer to [facility]. 1:1 sitter in direct view of patient.

## 2022-04-09 NOTE — ED NOTES
ERP notified of persistent, unchanged CP, EKG obtained and reviewed by ERP. Medicated per MAR for CP and generalized pain including chronic back and bilat shoulder pain. Pt requesting PIVs to be removed, PIVs d/c'd per ERP VO. ERP declined need for continued cardiac monitoring. VS taken, needs met. 1:1 sitter in direct view of patient.

## 2022-04-09 NOTE — ED NOTES
Rounded on pt. Pt sitting in bed watching tv, respirations even and unlabored, no behavorial indicators of distress or pain, 1:1 sitter in place.

## 2022-04-09 NOTE — ED NOTES
Patient complaining of headache, 5/10 pain. Pt resting comfortably. Respirations even and unlabored. All needs met at this time. 1:1 observation maintained.

## 2022-04-09 NOTE — DISCHARGE PLANNING
Alert Team/Behavioral Health Note:    Talked to Yaya @ [facility]. If Covid test is negative, [facility] will staff referral with psychiatrist for admission.

## 2022-04-09 NOTE — DISCHARGE PLANNING
[facility] ED Behavioral Health Fax Referral      [facility] ED Behavioral Health Alert Team:  [phone]    Referral: Legal Hold    Intervention: Patient referral to [facility] inpatient  facillity    Legal Hold Initiated: Date: 4/8/22 Time: 1300    Patient’s Insurance Listed on Face Sheet: Medicare, Medical    Referrals sent to: [facility], [facility]    Referrals faxed by WALTER Harris    This referral contains the following information:  1) Face sheet __x__  2) Page 1 and Page 2 of Legal Hold _x___  3) Alert Team Assessment/Psych Assessment __x__  4) Head to toe physical exam __x__  5) Urine Drug Screen _x___  6)  Alcohol __x__  7) Vital signs __x__  8) Pregnancy test when applicable ___  9) Medications list __x__  10) Covid screening ____    Plan: Patient will transfer to mental health facility once acceptance is obtained

## 2022-04-09 NOTE — ED NOTES
Med rec complete per pt at bedside.  Allergies reviewed and updated.  Confirmed patient's home pharmacy preference.    Pt reports no ABX it the last 30 days.

## 2022-04-09 NOTE — ED NOTES
Pt resting comfortably. Respirations even and unlabored. All needs met at this time. 1:1 observation maintained.

## 2022-04-09 NOTE — ED NOTES
Patient's home medications have been reviewed by the pharmacy team.     Past Medical History:   Diagnosis Date   • Chronic obstructive pulmonary disease (HCC)        Patient's Medications    No medications on file          A:  Medications do not appear to be contributing to current complaints.       P:    No recommendations at this time. Home medications have been reordered as appropriate.        Kayden VazquezD

## 2022-04-09 NOTE — ED NOTES
Pt resting comfortably. Respirations even and unlabored. All needs met at this time. 1:1 observation maintained. Pt given sandwich, medicated per MAR. No other needs at this time.

## 2022-04-09 NOTE — ED NOTES
Pt amb to rr for BM with steady gait. Hot meal provided per pt request. 1:1 sitter in direct view of patient.

## 2022-04-09 NOTE — DISCHARGE PLANNING
Alert Team/Behavioral Health Note:    Called [facility] and talked to Kaylah. Negative Covid test result has been received. Referral will be staffed with psychiatrist.

## 2022-04-09 NOTE — ED NOTES
Assumed pt care, report received. Pt resting with even chest rise and fall. 1:1 sitter in direct view of patient.

## 2022-04-09 NOTE — ED NOTES
"Pt awake, requesting inhaler. Medicated per MAR. When asked about SI, pt states, \"I'm not going to answer that.\" Pt c/o persistent CP, unchanged from last night, but otherwise declines to provide details stating \"I have pain all over, I don't know what you want.\" Warm blankets provided. 1:1 sitter in direct view of patient.    "

## 2022-04-09 NOTE — CONSULTS
"[facility]   TRIAGE ASSESSMENT    Name: Mohsen Adler  MRN: [id number]  : 1950  Age: 71 y.o.  Date of assessment: 2022  PCP: Pcp Pt States None  Persons in attendance: Patient  Patient Location: [facility]    CHIEF COMPLAINT/PRESENTING ISSUE (as stated by patient): 71 year old male BIB self today wit c/o chest pain; once medically cleared, pt verbalized SI, no plan; states he travelled to [place] by train from [place], [place], then [place], [place], approx 1 week ago to \"get out of town\"; states he lost his [employer] housing in [place] and plans on working with a [facility] to get [facility] housing re instituted; states he [facility] [place], [place] for a few days prior to arriving in [place]; pt has not received tx at the [facility] since arriving here; states he receives $1000 month SS payments which her received 4/3/22 and is now \"out of money\"; states he spent it on food and lodging ($100 night) since 4/3/22;  pt alert, oriented x 4; calm; cooperative; pleasant; with organized thoughts and behaviors; no delusions, paranoia, hallucinations noted; insight, judgment adequate; currently denies HI; states h/o chronic SI and wants to start an \"antidepressant\"; denies h/o self-harm or SA; denies current psych meds but with h/o taking Trazodone and Melatonin; current substance use includes ETOH up to 1 pint daily with last use  and occasional THC use; pt is currently homeless x 1 day in [place]           Chief Complaint   Patient presents with   • Chest Pain     CP Started about an hour ago. Sharp pain from r lower side of chest to left side. Per pt \"left arm went numb\". Pain worsens during inspiration.        CURRENT LIVING SITUATION/SOCIAL SUPPORT/FINANCIAL RESOURCES: states he travelled to [place] by train from [place], [place], then [place], [place], approx 1 week ago to \"get out of town\"; pt is currently homeless x 1 day in [place], ran out of his $1000 month SS " payments    BEHAVIORAL HEALTH/SUBSTANCE USE TREATMENT HISTORY  Does patient/parent report a history of prior behavioral health/substance use treatment for patient?   Yes:    Dates Level of Care Facilty/Provider Diagnosis/Problem Medications   [facility]     [facility] in [place], [place]       SAFETY ASSESSMENT - SELF  Does patient acknowledge current or past symptoms of dangerousness to self or is previous history noted? Yes-today, SI, no plan; states h/o chronic SI  Does parent/significant other report patient has current or past symptoms of dangerousness to self? N\A  Does presenting problem suggest symptoms of dangerousness to self? Yes:     Past Current    Suicidal Thoughts: [x]  [x]    Suicidal Plans: []  []    Suicidal Intent: []  []    Suicide Attempts: []  []    Self-Injury []  []      For any boxes checked above, provide detail: -today, SI, no plan; states h/o chronic SI      History of suicide by family member: no  History of suicide by friend/significant other: no  Recent change in frequency/specificity/intensity of suicidal thoughts or self-harm behavior? yes - today  Current access to firearms, medications, or other identified means of suicide/self-harm? no  If yes, willing to restrict access to means of suicide/self-harm? yes - no guns or weapons  Protective factors present:  Future-oriented, Optimism, Positive coping skills, Positive self-efficacy and Willing to address in treatment    SAFETY ASSESSMENT - OTHERS  Does patient acknowledge current or past symptoms of aggressive behavior or risk to others or is previous history noted? no  Does parent/significant other report patient has current or past symptoms of aggressive behavior or risk to others?  N\A  Does presenting problem suggest symptoms of dangerousness to others? No    LEGAL HISTORY  Does patient acknowledge history of arrest/senior living/care home or is previous history  "noted? no    Crisis Safety Plan completed and copy given to patient? No-pt cont on legal hold    ABUSE/NEGLECT SCREENING  Does patient report feeling “unsafe” in his/her home, or afraid of anyone?  no  Does patient report any history of physical, sexual, or emotional abuse?  no  Does parent or significant other report any of the above? N\A  Is there evidence of neglect by self?  no  Is there evidence of neglect by a caregiver? no  Does the patient/parent report any history of CPS/APS/police involvement related to suspected abuse/neglect or domestic violence? no  Based on the information provided during the current assessment, is a mandated report of suspected abuse/neglect being made?  No    SUBSTANCE USE SCREENING  Yes:  Mesfin all substances used in the past 30 days:      Last Use Amount   [x]   Alcohol 4/6/22 1 pint liquor daily   [x]   Marijuana occassional use    []   Heroin     []   Prescription Opioids  (used without prescription, for    recreation, or in excess of prescribed amount)     []   Other Prescription  (used without prescription, for    recreation, or in excess of prescribed amount)     []   Cocaine      []   Methamphetamine     []   \"\" drugs (ectasy, MDMA)     []   Other substances        UDS results: + BZD  Breathalyzer results: negative    What consequences does the patient associate with any of the above substance use and or addictive behaviors? Health problems    Risk factors for detox (check all that apply):  []  Seizures   []  Diaphoretic (sweating)   []  Tremors   []  Hallucinations   []  Increased blood pressure   []  Decreased blood pressure   []  Other   [x]  None      [] Patient education on risk factors for detoxification and instructed to return to ER as needed.      MENTAL STATUS   Participation: Active verbal participation, Attentive, Engaged and Open to feedback  Grooming: Casual and Neat  Orientation: Alert and Fully Oriented  Behavior: Calm  Eye contact: Good  Mood: " Depressed  Affect: Flat  Thought process: Logical, Goal-directed and Circumstantial  Thought content: Within normal limits  Speech: Rate within normal limits and Volume within normal limits  Perception: Within normal limits  Memory:  No gross evidence of memory deficits  Insight: Adequate  Judgment:  Adequate  Other:    Collateral information:   Source:  [] Significant other present in person:   [] Significant other by telephone  [] [facility]   [x] [facility] Nursing Staff  [x] [facility]  [] Other:     [] Unable to complete full assessment due to:  [] Acute intoxication  [] Patient declined to participate/engage  [] Patient verbally unresponsive  [] Significant cognitive deficits  [] Significant perceptual distortions or behavioral disorganization  [] Other:      CLINICAL IMPRESSIONS:  Primary:  Situational depressed mood  Secondary:  R/o alcohol use d/o        IDENTIFIED NEEDS/PLAN:  [Trigger DISPOSITION list for any items marked]    []  Imminent safety risk - self [] Imminent safety risk - others   []  Acute substance withdrawal []  Psychosis/Impaired reality testing   [x]  Mood/anxiety [x]  Substance use/Addictive behavior   [x]  Maladaptive behaviro []  Parent/child conflict   []  Family/Couples conflict []  Biomedical   [x]  Housing [x]  Financial   []   Legal  Occupational/Educational   []  Domestic violence []  Other:     Recommended Plan of Care:  Actively being addressed by Legal Hold and [facility] Emergency Department; Medicare and [place] insurance plans listed; Continue pt level of observation per the [place] Suicide Severity Rating Scale (C-SSRS) assessment completed by [facility] ED RN every shift, currently at high risk; 1:1 monitoring    For future resources, writer RN reviewed community , CD, and homeless resources with  pt, with written information given, including [facility], [facility], and homeless shelter;pt verbalized understanding;    Has the Recommended Plan of  Care/Level of Observation been reviewed with the patient's assigned nurse? yes    Does patient/parent or guardian express agreement with the above plan? yes      Referral appointment(s) scheduled? no    Alert team only:   I have discussed findings and recommendations with Dr. Zapata who is in agreement with these recommendations. Legal hold completed by Dr. Phoenix    Referral information sent to the following outpatient community providers :[facility]    Referral information sent to the following inpatient [facility] providers : [facility], [facility]    If applicable : Referred  to  Alert Team for legal hold follow up at (time): 6/8/22 at 1300      Kathleen Pacheco R.N.  4/8/2022

## 2022-04-10 NOTE — ED NOTES
"Albuterol inhaler admin per MAR. Pt states that he usually does not require a rescue inhaler as frequently but that he takes a \"big powder inhaler\" in the morning. Pt also states that he has PRN ativan for anxiety and panic attacks. Med rec to be updated by med rec tech. 1:1 sitter in direct view of patient.    "

## 2022-04-10 NOTE — ED NOTES
[facility] to [facility], no additional located. Interviewed pt at bedside.  Pt stated that he [place] his medications [place].    Informed pharmacist and nurse

## 2022-04-10 NOTE — ED NOTES
"Med rec tech attempted to update home meds but per med [place], [facility] has no record of Rx within last year. Pt is reportedly prescribed 3 unk antidepressants, 50mg trazodone, 5mg melatonin, a \"powdered inhaler,\" albuterol inhaler PRN, ativan 2mg PRN. ERP notified of pt's reported increased use of albuterol inhaler and possible home meds. Pt also requesting ativan for escalating anxiety. ERP notified. 1:1 sitter in direct view of patient.    "

## 2022-04-10 NOTE — DISCHARGE PLANNING
Dr Staton at [facility] has accepted pt who should be transported at 7 pm per [facility]

## 2022-04-10 NOTE — ED NOTES
[facility]. Pt ambulatory to ambulance bay with [facility] for transport to [facility].

## 2022-04-10 NOTE — DISCHARGE PLANNING
Legal Hold Transfer     Referral: Legal hold transfer to [facility]     Pt's accepting physician is MD Corey.     Transport arranged through [facility].    Talked to Magalis @ [facility].    [facility] PCS form scanned in media manger.     The pt will be picked up at 1900.

## 2022-04-13 LAB
BACTERIA BLD CULT: NORMAL
BACTERIA BLD CULT: NORMAL
SIGNIFICANT IND 70042: NORMAL
SIGNIFICANT IND 70042: NORMAL
SITE SITE: NORMAL
SITE SITE: NORMAL
SOURCE SOURCE: NORMAL
SOURCE SOURCE: NORMAL

## 2022-04-16 ENCOUNTER — APPOINTMENT (OUTPATIENT)
Dept: RADIOLOGY | Facility: MEDICAL CENTER | Age: 72
End: 2022-04-16
Attending: EMERGENCY MEDICINE
Payer: COMMERCIAL

## 2022-04-16 ENCOUNTER — HOSPITAL ENCOUNTER (EMERGENCY)
Facility: MEDICAL CENTER | Age: 72
End: 2022-04-18
Attending: EMERGENCY MEDICINE
Payer: COMMERCIAL

## 2022-04-16 DIAGNOSIS — F15.929: ICD-10-CM

## 2022-04-16 DIAGNOSIS — F10.929 ALCOHOLIC INTOXICATION WITH COMPLICATION (HCC): ICD-10-CM

## 2022-04-16 DIAGNOSIS — R45.851 SUICIDAL IDEATION: ICD-10-CM

## 2022-04-16 DIAGNOSIS — R41.82 ALTERED MENTAL STATUS, UNSPECIFIED ALTERED MENTAL STATUS TYPE: ICD-10-CM

## 2022-04-16 LAB
ALBUMIN SERPL BCP-MCNC: 3.7 G/DL (ref 3.2–4.9)
ALBUMIN/GLOB SERPL: 1.4 G/DL
ALP SERPL-CCNC: 113 U/L (ref 30–99)
ALT SERPL-CCNC: 33 U/L (ref 2–50)
AMPHET UR QL SCN: POSITIVE
ANION GAP SERPL CALC-SCNC: 15 MMOL/L (ref 7–16)
AST SERPL-CCNC: 39 U/L (ref 12–45)
BARBITURATES UR QL SCN: NEGATIVE
BASOPHILS # BLD AUTO: 0.5 % (ref 0–1.8)
BASOPHILS # BLD: 0.04 K/UL (ref 0–0.12)
BENZODIAZ UR QL SCN: NEGATIVE
BILIRUB SERPL-MCNC: 0.6 MG/DL (ref 0.1–1.5)
BUN SERPL-MCNC: 19 MG/DL (ref 8–22)
BZE UR QL SCN: NEGATIVE
CALCIUM SERPL-MCNC: 8.5 MG/DL (ref 8.5–10.5)
CANNABINOIDS UR QL SCN: NEGATIVE
CHLORIDE SERPL-SCNC: 98 MMOL/L (ref 96–112)
CO2 SERPL-SCNC: 18 MMOL/L (ref 20–33)
CREAT SERPL-MCNC: 0.85 MG/DL (ref 0.5–1.4)
EOSINOPHIL # BLD AUTO: 0.35 K/UL (ref 0–0.51)
EOSINOPHIL NFR BLD: 4 % (ref 0–6.9)
ERYTHROCYTE [DISTWIDTH] IN BLOOD BY AUTOMATED COUNT: 49.2 FL (ref 35.9–50)
ETHANOL BLD-MCNC: 43.4 MG/DL (ref 0–10)
GFR SERPLBLD CREATININE-BSD FMLA CKD-EPI: 94 ML/MIN/1.73 M 2
GLOBULIN SER CALC-MCNC: 2.6 G/DL (ref 1.9–3.5)
GLUCOSE SERPL-MCNC: 87 MG/DL (ref 65–99)
HCT VFR BLD AUTO: 36.5 % (ref 42–52)
HGB BLD-MCNC: 12.6 G/DL (ref 14–18)
IMM GRANULOCYTES # BLD AUTO: 0.05 K/UL (ref 0–0.11)
IMM GRANULOCYTES NFR BLD AUTO: 0.6 % (ref 0–0.9)
LYMPHOCYTES # BLD AUTO: 2.12 K/UL (ref 1–4.8)
LYMPHOCYTES NFR BLD: 24.5 % (ref 22–41)
MCH RBC QN AUTO: 31.5 PG (ref 27–33)
MCHC RBC AUTO-ENTMCNC: 34.5 G/DL (ref 33.7–35.3)
MCV RBC AUTO: 91.3 FL (ref 81.4–97.8)
METHADONE UR QL SCN: NEGATIVE
MONOCYTES # BLD AUTO: 1.52 K/UL (ref 0–0.85)
MONOCYTES NFR BLD AUTO: 17.6 % (ref 0–13.4)
NEUTROPHILS # BLD AUTO: 4.57 K/UL (ref 1.82–7.42)
NEUTROPHILS NFR BLD: 52.8 % (ref 44–72)
NRBC # BLD AUTO: 0 K/UL
NRBC BLD-RTO: 0 /100 WBC
OPIATES UR QL SCN: NEGATIVE
OXYCODONE UR QL SCN: NEGATIVE
PCP UR QL SCN: NEGATIVE
PLATELET # BLD AUTO: 203 K/UL (ref 164–446)
PMV BLD AUTO: 9.5 FL (ref 9–12.9)
POTASSIUM SERPL-SCNC: 3.7 MMOL/L (ref 3.6–5.5)
PROPOXYPH UR QL SCN: NEGATIVE
PROT SERPL-MCNC: 6.3 G/DL (ref 6–8.2)
RBC # BLD AUTO: 4 M/UL (ref 4.7–6.1)
SODIUM SERPL-SCNC: 131 MMOL/L (ref 135–145)
WBC # BLD AUTO: 8.7 K/UL (ref 4.8–10.8)

## 2022-04-16 PROCEDURE — 85025 COMPLETE CBC W/AUTO DIFF WBC: CPT

## 2022-04-16 PROCEDURE — 80307 DRUG TEST PRSMV CHEM ANLYZR: CPT

## 2022-04-16 PROCEDURE — 99285 EMERGENCY DEPT VISIT HI MDM: CPT

## 2022-04-16 PROCEDURE — 80053 COMPREHEN METABOLIC PANEL: CPT

## 2022-04-16 PROCEDURE — 700111 HCHG RX REV CODE 636 W/ 250 OVERRIDE (IP): Performed by: EMERGENCY MEDICINE

## 2022-04-16 PROCEDURE — 82077 ASSAY SPEC XCP UR&BREATH IA: CPT

## 2022-04-16 PROCEDURE — 96372 THER/PROPH/DIAG INJ SC/IM: CPT

## 2022-04-16 PROCEDURE — 71045 X-RAY EXAM CHEST 1 VIEW: CPT

## 2022-04-16 PROCEDURE — 36415 COLL VENOUS BLD VENIPUNCTURE: CPT

## 2022-04-16 RX ORDER — HALOPERIDOL 5 MG/ML
10 INJECTION INTRAMUSCULAR ONCE
Status: COMPLETED | OUTPATIENT
Start: 2022-04-16 | End: 2022-04-16

## 2022-04-16 RX ADMIN — HALOPERIDOL LACTATE 10 MG: 5 INJECTION, SOLUTION INTRAMUSCULAR at 21:43

## 2022-04-16 ASSESSMENT — FIBROSIS 4 INDEX: FIB4 SCORE: 3.02

## 2022-04-17 ENCOUNTER — APPOINTMENT (OUTPATIENT)
Dept: RADIOLOGY | Facility: MEDICAL CENTER | Age: 72
End: 2022-04-17
Attending: EMERGENCY MEDICINE
Payer: COMMERCIAL

## 2022-04-17 LAB — GLUCOSE BLD STRIP.AUTO-MCNC: 84 MG/DL (ref 65–99)

## 2022-04-17 PROCEDURE — 90791 PSYCH DIAGNOSTIC EVALUATION: CPT | Performed by: PSYCHOLOGIST

## 2022-04-17 PROCEDURE — 96374 THER/PROPH/DIAG INJ IV PUSH: CPT

## 2022-04-17 PROCEDURE — A9270 NON-COVERED ITEM OR SERVICE: HCPCS | Performed by: EMERGENCY MEDICINE

## 2022-04-17 PROCEDURE — 700111 HCHG RX REV CODE 636 W/ 250 OVERRIDE (IP): Performed by: EMERGENCY MEDICINE

## 2022-04-17 PROCEDURE — 82962 GLUCOSE BLOOD TEST: CPT

## 2022-04-17 PROCEDURE — G1004 CDSM NDSC: HCPCS

## 2022-04-17 PROCEDURE — 700102 HCHG RX REV CODE 250 W/ 637 OVERRIDE(OP): Performed by: EMERGENCY MEDICINE

## 2022-04-17 RX ORDER — LORAZEPAM 2 MG/ML
1 INJECTION INTRAMUSCULAR ONCE
Status: COMPLETED | OUTPATIENT
Start: 2022-04-17 | End: 2022-04-17

## 2022-04-17 RX ORDER — TRAZODONE HYDROCHLORIDE 50 MG/1
50 TABLET ORAL ONCE
Status: COMPLETED | OUTPATIENT
Start: 2022-04-17 | End: 2022-04-17

## 2022-04-17 RX ORDER — UREA 10 %
3 LOTION (ML) TOPICAL NIGHTLY
Status: DISCONTINUED | OUTPATIENT
Start: 2022-04-17 | End: 2022-04-18 | Stop reason: HOSPADM

## 2022-04-17 RX ADMIN — TRAZODONE HYDROCHLORIDE 50 MG: 50 TABLET ORAL at 20:48

## 2022-04-17 RX ADMIN — LORAZEPAM 1 MG: 2 INJECTION INTRAMUSCULAR; INTRAVENOUS at 16:36

## 2022-04-17 RX ADMIN — Medication 3 MG: at 20:49

## 2022-04-17 ASSESSMENT — LIFESTYLE VARIABLES
AUDITORY DISTURBANCES: NOT PRESENT
NAUSEA AND VOMITING: NO NAUSEA AND NO VOMITING
PAROXYSMAL SWEATS: NO SWEAT VISIBLE
NAUSEA AND VOMITING: NO NAUSEA AND NO VOMITING
ORIENTATION AND CLOUDING OF SENSORIUM: ORIENTED AND CAN DO SERIAL ADDITIONS
ANXIETY: MILDLY ANXIOUS
TOTAL SCORE: 1
NAUSEA AND VOMITING: NO NAUSEA AND NO VOMITING
PAROXYSMAL SWEATS: NO SWEAT VISIBLE
TREMOR: TREMOR NOT VISIBLE BUT CAN BE FELT, FINGERTIP TO FINGERTIP
ORIENTATION AND CLOUDING OF SENSORIUM: ORIENTED AND CAN DO SERIAL ADDITIONS
TREMOR: TREMOR NOT VISIBLE BUT CAN BE FELT, FINGERTIP TO FINGERTIP
AUDITORY DISTURBANCES: NOT PRESENT
VISUAL DISTURBANCES: NOT PRESENT
ANXIETY: MILDLY ANXIOUS
AGITATION: NORMAL ACTIVITY
AGITATION: NORMAL ACTIVITY
TREMOR: *
AUDITORY DISTURBANCES: NOT PRESENT
TOTAL SCORE: 2
VISUAL DISTURBANCES: NOT PRESENT
AGITATION: NORMAL ACTIVITY
ORIENTATION AND CLOUDING OF SENSORIUM: ORIENTED AND CAN DO SERIAL ADDITIONS
HEADACHE, FULLNESS IN HEAD: NOT PRESENT
VISUAL DISTURBANCES: VERY MILD SENSITIVITY
HEADACHE, FULLNESS IN HEAD: NOT PRESENT
HEADACHE, FULLNESS IN HEAD: NOT PRESENT
TOTAL SCORE: 5
ANXIETY: NO ANXIETY (AT EASE)
PAROXYSMAL SWEATS: NO SWEAT VISIBLE

## 2022-04-17 NOTE — ED NOTES
"Pt. Awakens to voice. Verbalized understanding of ambulation trial but then states \"When I leave from here I have nothing and I just want to die. I'm 72 years old, I fought in a war and I know how to kill myself.\" MD aware and states we will plan to have him evaluated by Alert Team.   "

## 2022-04-17 NOTE — ED NOTES
Patient resting on gurney with eyes closed. Respirations even and unlabored. No s/s of distress noted. VSS.

## 2022-04-17 NOTE — PROGRESS NOTES
Patient's home medications have been reviewed by the pharmacy team.     Past Medical History:   Diagnosis Date   • Chronic obstructive pulmonary disease (HCC)        Patient's Medications    No medications on file          A:  Medications do not appear to be contributing to current complaints.     P:    No recommendations at this time.       Bandar Wright PharmD, BCPS

## 2022-04-17 NOTE — PROGRESS NOTES
"ED Provider Progress Note      ED Observation Progress Note    Date of Service: 04/17/22    Interval History  This is a 71-year-old who came in agitated and intoxicated.  He required sedation to prevent injury to staff and self.  I was asked to reevaluate the patient when sober.    The primary RN attempted to ambulate the patient in the middle of my shift.  He was not steady enough to do so.    At the end of my shift, the patient was sober but now complained of suicidality to the RN.  [employer] consult placed. Patient signed out to Dr. Ortega, the oncoming ERP.    Physical Exam  /71   Pulse (!) 57   Temp 36.4 °C (97.6 °F) (Temporal)   Resp 16   Ht 1.778 m (5' 10\")   Wt 81.6 kg (179 lb 14.3 oz)   SpO2 95%   BMI 25.81 kg/m² .    Constitutional: Asleep, snoring. Nontoxic  HENT:  Grossly normal  Eyes: Closed  Neck: No stridor  Cardiovascular: Normal heart rate   Thorax & Lungs: No respiratory distress  Abdomen: Not distended  Skin:  Good color  Extremities: no obvious trauma  Psychiatric: Asleep, snoring    Labs  Results for orders placed or performed during the hospital encounter of 04/16/22   CBC WITH DIFFERENTIAL   Result Value Ref Range    WBC 8.7 4.8 - 10.8 K/uL    RBC 4.00 (L) 4.70 - 6.10 M/uL    Hemoglobin 12.6 (L) 14.0 - 18.0 g/dL    Hematocrit 36.5 (L) 42.0 - 52.0 %    MCV 91.3 81.4 - 97.8 fL    MCH 31.5 27.0 - 33.0 pg    MCHC 34.5 33.7 - 35.3 g/dL    RDW 49.2 35.9 - 50.0 fL    Platelet Count 203 164 - 446 K/uL    MPV 9.5 9.0 - 12.9 fL    Neutrophils-Polys 52.80 44.00 - 72.00 %    Lymphocytes 24.50 22.00 - 41.00 %    Monocytes 17.60 (H) 0.00 - 13.40 %    Eosinophils 4.00 0.00 - 6.90 %    Basophils 0.50 0.00 - 1.80 %    Immature Granulocytes 0.60 0.00 - 0.90 %    Nucleated RBC 0.00 /100 WBC    Neutrophils (Absolute) 4.57 1.82 - 7.42 K/uL    Lymphs (Absolute) 2.12 1.00 - 4.80 K/uL    Monos (Absolute) 1.52 (H) 0.00 - 0.85 K/uL    Eos (Absolute) 0.35 0.00 - 0.51 K/uL    Baso (Absolute) 0.04 0.00 - " 0.12 K/uL    Immature Granulocytes (abs) 0.05 0.00 - 0.11 K/uL    NRBC (Absolute) 0.00 K/uL   COMP METABOLIC PANEL   Result Value Ref Range    Sodium 131 (L) 135 - 145 mmol/L    Potassium 3.7 3.6 - 5.5 mmol/L    Chloride 98 96 - 112 mmol/L    Co2 18 (L) 20 - 33 mmol/L    Anion Gap 15.0 7.0 - 16.0    Glucose 87 65 - 99 mg/dL    Bun 19 8 - 22 mg/dL    Creatinine 0.85 0.50 - 1.40 mg/dL    Calcium 8.5 8.5 - 10.5 mg/dL    AST(SGOT) 39 12 - 45 U/L    ALT(SGPT) 33 2 - 50 U/L    Alkaline Phosphatase 113 (H) 30 - 99 U/L    Total Bilirubin 0.6 0.1 - 1.5 mg/dL    Albumin 3.7 3.2 - 4.9 g/dL    Total Protein 6.3 6.0 - 8.2 g/dL    Globulin 2.6 1.9 - 3.5 g/dL    A-G Ratio 1.4 g/dL   DIAGNOSTIC ALCOHOL   Result Value Ref Range    Diagnostic Alcohol 43.4 (H) 0.0 - 10.0 mg/dL   URINE DRUG SCREEN (TRIAGE)   Result Value Ref Range    Amphetamines Urine Positive (A) Negative    Barbiturates Negative Negative    Benzodiazepines Negative Negative    Cocaine Metabolite Negative Negative    Methadone Negative Negative    Opiates Negative Negative    Oxycodone Negative Negative    Phencyclidine -Pcp Negative Negative    Propoxyphene Negative Negative    Cannabinoid Metab Negative Negative   ESTIMATED GFR   Result Value Ref Range    GFR (CKD-EPI) 94 >60 mL/min/1.73 m 2   POCT glucose device results   Result Value Ref Range    POC Glucose, Blood 84 65 - 99 mg/dL       Radiology  CT-HEAD W/O   Final Result      1.  Diffuse atrophy.   2.  No intracranial hemorrhage or focal edema.   3.  Mild chronic paranasal sinus disease.         DX-CHEST-PORTABLE (1 VIEW)   Final Result      1.  Persistent elevation LEFT hemidiaphragm with LEFT midlung scar again seen.   2.  No pneumonia or pulmonary edema.          Problem List  1. Altered mental status, unspecified altered mental status type     2. Alcoholic intoxication with complication (HCC)     3. Amphetamine intoxication with complication (HCC)     4. Suicidal ideation         Electronically signed by:  Chanda Rivera M.D., 4/17/2022 2:13 PM

## 2022-04-17 NOTE — ED NOTES
Pt. Refusing to ambulate at this time. Verbalized understanding of plan for discharge and need for ambulation.

## 2022-04-17 NOTE — ED NOTES
Patient called on call light. Concerned about having alcoholic withdrawal seizures. States that last alcoholic drink was yesterday morning. ERP notified.

## 2022-04-17 NOTE — ED NOTES
Patient resting in bed. Provided applesauce and courtney crackers. Patient denies any further needs.

## 2022-04-17 NOTE — ED NOTES
"Attempted to ambulate in room. Gait noted to be unsteady. Pt states he is confused at how he got here; reoriented to place and events. Is able to state name, , year, president but states \"how did I get back to [place]? I don't know what I'm doing here.\" Back to bed with linens changed. NIBP, pulse ox and cardiac monitoring in place.   "

## 2022-04-17 NOTE — ED PROVIDER NOTES
"ED Provider Note    Scribed for Jeff Davis M.D. by Марина Pinto. 4/16/2022  9:13 PM    Primary care provider: Pcp Pt States None    History obtained from: patient  History limited by: mental status    CHIEF COMPLAINT  Chief Complaint   Patient presents with   • ALOC     Aox1, not responding to questions appropriately and verbally aggressive with questions, states has been x2 days since has had any ETOH and will not say how much he drinks, pt smells like ETOH, states is not homeless, drifts off to sleep and will wake with verbal stimulation       HPI  Mohsen Adler is a 71 y.o. male who presents to the Emergency Department with slurred speech and violent behavior.   Pt states \"fuck you get out of my face\".  Pt unable to follow safety instructions.      REVIEW OF SYSTEMS  Pertinent positives include: violent behavior, unable to obtain  PAST MEDICAL HISTORY   has a past medical history of Chronic obstructive pulmonary disease (HCC).    SURGICAL HISTORY  patient denies any surgical history    SOCIAL HISTORY  Social History     Tobacco Use   • Smoking status: Current Every Day Smoker     Packs/day: 0.50     Types: Cigarettes   • Smokeless tobacco: Never Used   Vaping Use   • Vaping Use: Never used   Substance Use Topics   • Alcohol use: Yes     Comment: 1 pint of vodka per day, \"whenever I get a chance\"   • Drug use: Never      Social History     Substance and Sexual Activity   Drug Use Never       FAMILY HISTORY  No family history on file.    CURRENT MEDICATIONS  Home Medications     Reviewed by Cinthia Ortiz R.N. (Registered Nurse) on 04/16/22 at 2011  Med List Status: Partial   Medication Last Dose Status        Patient Nehemiah Taking any Medications                       ALLERGIES  Allergies   Allergen Reactions   • Seroquel [Quetiapine]      Pt states that it exacerbates his restless leg syndrome.        PHYSICAL EXAM  VITAL SIGNS: /62   Pulse 86   Temp 36.4 °C (97.6 °F) (Temporal)   Resp 18   " "Ht 1.778 m (5' 10\")   Wt 81.6 kg (179 lb 14.3 oz)   SpO2 94% Comment: RA  BMI 25.81 kg/m²     Constitutional: Alert, agitated, restlesss  HENT: No signs of trauma, Bilateral external ears normal, Nose normal. Uvula midline.   Eyes: Pupils are equal and reactive, Conjunctiva normal, Non-icteric.   Neck: Normal range of motion, No tenderness, Supple, No stridor.   Lymphatic: No lymphadenopathy noted.   Cardiovascular: Regular rate and rhythm, no murmurs.   Thorax & Lungs: Normal breath sounds, No respiratory distress, No wheezing, No chest tenderness.   Abdomen:  Soft, No tenderness, No peritoneal signs, No masses, No pulsatile masses.   Skin: Warm, Dry, No erythema, No rash.   Back: No bony tenderness, No CVA tenderness.   Extremities: Intact distal pulses, No edema, No tenderness, No cyanosis.  Musculoskeletal: Good range of motion in all major joints. No tenderness to palpation or major deformities noted.   Neurologic: Alert , Normal motor function, Normal sensory function, No focal deficits noted.   Psychiatric: pt states \"fuck you get, the fuck out of my face\"  No SI or HI    DIAGNOSTIC STUDIES / PROCEDURES    LABS  Labs Reviewed   CBC WITH DIFFERENTIAL - Abnormal; Notable for the following components:       Result Value    RBC 4.00 (*)     Hemoglobin 12.6 (*)     Hematocrit 36.5 (*)     Monocytes 17.60 (*)     Monos (Absolute) 1.52 (*)     All other components within normal limits   COMP METABOLIC PANEL - Abnormal; Notable for the following components:    Sodium 131 (*)     Co2 18 (*)     Alkaline Phosphatase 113 (*)     All other components within normal limits   DIAGNOSTIC ALCOHOL - Abnormal; Notable for the following components:    Diagnostic Alcohol 43.4 (*)     All other components within normal limits   URINE DRUG SCREEN - Abnormal; Notable for the following components:    Amphetamines Urine Positive (*)     All other components within normal limits   ESTIMATED GFR   POCT GLUCOSE   POCT URINALYSIS    "   All labs reviewed by me.        RADIOLOGY  DX-CHEST-PORTABLE (1 VIEW)   Final Result      1.  Persistent elevation LEFT hemidiaphragm with LEFT midlung scar again seen.   2.  No pneumonia or pulmonary edema.        The radiologist's interpretation of all radiological studies have been reviewed by me.    COURSE & MEDICAL DECISION MAKING  Nursing notes, VS, PMSFHx reviewed in chart.    71 y.o. male p/w chief complaint of AMS.    9:13 PM Patient seen and examined at bedside.      I verified that the patient was wearing a mask and I was wearing appropriate PPE every time I entered the room. The patient's mask was on the patient at all times during my encounter except for a brief view of the oropharynx.     The differential diagnoses include but are not limited to:     9:39 PM- Ordered Haldol 10 mg due to patient's aggressive behavior during initial examination  Patient placed in ED observation at this time.  Due to altered mentation at time of observation plan to reassess family history and past medical history once patient.  At this time patient placed then observation status due to inability to follow commands and concern for clinical worsening and consideration for admission if patient were to worsen.  After administration of 10 mg of Haldol patient with significant improved symptoms and patient is able to follow commands.    Alcohol level found to be 43 and patient reports his last drink of alcohol greater than 48 hours ago  Patient found to be amphetamine positive on tox screen.    1:50 AM pt reasses at bedside by me with gradual improvement in altered mentation.    1:52 AM pt care signed out to oncDr. Tc Rivera pending sober reevaluation      FINAL IMPRESSION  1. Altered mental status, unspecified altered mental status type    2. Alcoholic intoxication with complication (HCC)    3. Amphetamine intoxication with complication (HCC)          Марина RODRIGUEZ (Scribiwona), am scribing for, and in the presence  ofJeff M.D..    Electronically signed by: Марина Pinto (Scribe), 4/16/2022    I, Jeff Davis M.D. personally performed the services described in this documentation, as scribed by Марина Pinto in my presence, and it is both accurate and complete.    The note accurately reflects work and decisions made by me.  Jeff Davis M.D.  4/17/2022  1:50 AM

## 2022-04-17 NOTE — ED NOTES
"Pt's sats 88-90%, placed pt on O2 NC, swatting at O2 and nurse. \"Get out of here, leave me the fuck alone\".   "

## 2022-04-17 NOTE — ED NOTES
"Pt assisted  into gown and into bed. Pt not wanting to help pit on gown or turn in bed. Pt states \"I think I shit my pants\". Bottoms removed for small amount of stool. Pt cleaned up, warm blankets provided. Pt refused to turn side to side, verbally abusive with staff.  "

## 2022-04-17 NOTE — ED TRIAGE NOTES
"Chief Complaint   Patient presents with   • ALOC     Aox1, not responding to questions appropriately and verbally aggressive with questions, states has been x2 days since has had any ETOH and will not say how much he drinks, states is not homeless, drifts off to sleep and will wake with verbal stimulation       Wheelchair to triage for above complaint. Pt smells like ETOH. Pt will not say how he got here, refuses to answer questions and responds wrong when pt does answer. SI/HI questions asked but no response from pt for positive ideation. -stroke assessment, equal smile, pt able to lift both arms appropriately and say clear dialogue.     ALOC protocol ordered. Call to charge regarding pt's status. Pt moved back to room green 37.    /62   Pulse 86   Temp 36.4 °C (97.6 °F) (Temporal)   Resp 18   Ht 1.778 m (5' 10\")   Wt 81.6 kg (179 lb 14.3 oz)   SpO2 94%   BMI 25.81 kg/m²     "

## 2022-04-17 NOTE — ED NOTES
Report received from prior RN. Pt Aox4. Resp normal/unlabored. Bed side rails up/in low position. Pt updated to POC and all questions answered.     Pt resting, no sign of distress

## 2022-04-18 VITALS
HEIGHT: 70 IN | WEIGHT: 179.9 LBS | TEMPERATURE: 97.6 F | OXYGEN SATURATION: 93 % | DIASTOLIC BLOOD PRESSURE: 53 MMHG | HEART RATE: 78 BPM | SYSTOLIC BLOOD PRESSURE: 116 MMHG | RESPIRATION RATE: 18 BRPM | BODY MASS INDEX: 25.75 KG/M2

## 2022-04-18 NOTE — DISCHARGE PLANNING
SW has given cab voucher to the Alert Team for Pt to go to [facility] for his Discharge.   Cab Voucher [id number].

## 2022-04-18 NOTE — DISCHARGE PLANNING
[facility], the patient is not registered in the system, the patient will need to call [phone] to get registered.  IF there are any question contact Tracy directly [phone].

## 2022-04-18 NOTE — ED NOTES
Taxi voucher provided for patient. Alert team RN called cab and walked patient out to lobby to wait for cab.

## 2022-04-18 NOTE — DISCHARGE PLANNING
Alert Team Note:     Contacted [facility], spoke to Sherin. Pt packet has been received. Pt is on the wait list. No beds available at this time.   BERTHA

## 2022-04-18 NOTE — ED NOTES
"Patient requesting ativan for detox, entered room and patient states \"Im have a seizure\" explained assessment appears as if he is stable to which he replied \"Get the fuck out of my room\" Patient appears in no major distress at this time.  Sitter in place.  Will monitor.    "

## 2022-04-18 NOTE — ED NOTES
Report received, care of pt taken over, pt is awake and offered a shower, pt refused, sitter outside the room.

## 2022-04-18 NOTE — ED PROVIDER NOTES
ED Provider Note Addendum    Scribed for Nura Umana D.O. by Isidra Joy on 4/18/2022 at 1:17 PM.     This is an addendum to the note on Mohsen Adler.  For further details and full chart information, see patient's initial note.       1:18 PM  - Patient evaluated by myself.  Patient is resting comfortably at this time with no complaints. Patient is oriented to day, time, and location. Patient was updated on plan of care. He has no current complaints. Patient verbalizes understanding and agreement to this plan of care.     Patient has had time to a detox.  He currently is awake alert oriented.  He has organized thoughts, he states that his ID and balls and cars are gone he needs to figure out a way to get those returned, or really issued.  And he has plans to go back to [place].    He is not suicidal not homicidal he is stable for discharge from the emergency department.  He does not want to be here    Disposition:  Patient will be transferred to an appropriate psychiatric facility in stable condition for further psychiatric care and evaluation.  Patient will be placed under the care of my partner awaiting transfer.    FINAL IMPRESSION  1. Altered mental status, unspecified altered mental status type    2. Alcoholic intoxication with complication (HCC)    3. Amphetamine intoxication with complication (HCC)    4. Suicidal ideation         Isidra RODRIGUEZ (Scribe), am scribing for, and in the presence of, Nura Umana D.O..    Electronically signed by: Isidra Joy (Brandonibe), 4/18/2022    Nura RODRIGUEZ D.O. personally performed the services described in this documentation, as scribed by Isidra Joy in my presence, and it is both accurate and complete. C.    The note accurately reflects work and decisions made by me.  Nura Umana D.O.  4/18/2022  1:29 PM

## 2022-04-18 NOTE — ED NOTES
Patient sitting on bed. Respirations even and unlabored. No s/s of distress noted. Sitter remains in direct sight of patient.

## 2022-04-18 NOTE — ED NOTES
Pt resting no sign of distress. No sitter at bedside. Charge nurse aware. No staffing for sitter will preform 15 minute checks until sitter available.

## 2022-04-18 NOTE — PROGRESS NOTES
"ED Provider Progress Note    ED Observation Progress Note    Date of Service: 04/18/22    Interval History  There have not been any events overnight.  The patient's vital signs have remained stable.  He was reassessed.  He has no complaints but reports that he has had alcohol withdrawal in the past.  There is no evidence of this.  He has no tremor.  His vital signs are normal.  We will continue observation.  All of his laboratory data was unremarkable.    Physical Exam  /53   Pulse 78   Temp 36.4 °C (97.6 °F) (Temporal)   Resp 18   Ht 1.778 m (5' 10\")   Wt 81.6 kg (179 lb 14.3 oz)   SpO2 93%   BMI 25.81 kg/m² .    Constitutional: Awake and alert. Nontoxic  HENT:  Grossly normal  Eyes: Grossly normal  Neck: Normal range of motion  Cardiovascular: Normal heart rate   Thorax & Lungs: No respiratory distress  Abdomen: Nontender  Skin:  No pathologic rash.   Extremities: Well perfused  Psychiatric: Patient seems angry    Labs  Results for orders placed or performed during the hospital encounter of 04/16/22   CBC WITH DIFFERENTIAL   Result Value Ref Range    WBC 8.7 4.8 - 10.8 K/uL    RBC 4.00 (L) 4.70 - 6.10 M/uL    Hemoglobin 12.6 (L) 14.0 - 18.0 g/dL    Hematocrit 36.5 (L) 42.0 - 52.0 %    MCV 91.3 81.4 - 97.8 fL    MCH 31.5 27.0 - 33.0 pg    MCHC 34.5 33.7 - 35.3 g/dL    RDW 49.2 35.9 - 50.0 fL    Platelet Count 203 164 - 446 K/uL    MPV 9.5 9.0 - 12.9 fL    Neutrophils-Polys 52.80 44.00 - 72.00 %    Lymphocytes 24.50 22.00 - 41.00 %    Monocytes 17.60 (H) 0.00 - 13.40 %    Eosinophils 4.00 0.00 - 6.90 %    Basophils 0.50 0.00 - 1.80 %    Immature Granulocytes 0.60 0.00 - 0.90 %    Nucleated RBC 0.00 /100 WBC    Neutrophils (Absolute) 4.57 1.82 - 7.42 K/uL    Lymphs (Absolute) 2.12 1.00 - 4.80 K/uL    Monos (Absolute) 1.52 (H) 0.00 - 0.85 K/uL    Eos (Absolute) 0.35 0.00 - 0.51 K/uL    Baso (Absolute) 0.04 0.00 - 0.12 K/uL    Immature Granulocytes (abs) 0.05 0.00 - 0.11 K/uL    NRBC (Absolute) 0.00 K/uL "   COMP METABOLIC PANEL   Result Value Ref Range    Sodium 131 (L) 135 - 145 mmol/L    Potassium 3.7 3.6 - 5.5 mmol/L    Chloride 98 96 - 112 mmol/L    Co2 18 (L) 20 - 33 mmol/L    Anion Gap 15.0 7.0 - 16.0    Glucose 87 65 - 99 mg/dL    Bun 19 8 - 22 mg/dL    Creatinine 0.85 0.50 - 1.40 mg/dL    Calcium 8.5 8.5 - 10.5 mg/dL    AST(SGOT) 39 12 - 45 U/L    ALT(SGPT) 33 2 - 50 U/L    Alkaline Phosphatase 113 (H) 30 - 99 U/L    Total Bilirubin 0.6 0.1 - 1.5 mg/dL    Albumin 3.7 3.2 - 4.9 g/dL    Total Protein 6.3 6.0 - 8.2 g/dL    Globulin 2.6 1.9 - 3.5 g/dL    A-G Ratio 1.4 g/dL   DIAGNOSTIC ALCOHOL   Result Value Ref Range    Diagnostic Alcohol 43.4 (H) 0.0 - 10.0 mg/dL   URINE DRUG SCREEN (TRIAGE)   Result Value Ref Range    Amphetamines Urine Positive (A) Negative    Barbiturates Negative Negative    Benzodiazepines Negative Negative    Cocaine Metabolite Negative Negative    Methadone Negative Negative    Opiates Negative Negative    Oxycodone Negative Negative    Phencyclidine -Pcp Negative Negative    Propoxyphene Negative Negative    Cannabinoid Metab Negative Negative   ESTIMATED GFR   Result Value Ref Range    GFR (CKD-EPI) 94 >60 mL/min/1.73 m 2   POCT glucose device results   Result Value Ref Range    POC Glucose, Blood 84 65 - 99 mg/dL       Radiology  CT-HEAD W/O   Final Result      1.  Diffuse atrophy.   2.  No intracranial hemorrhage or focal edema.   3.  Mild chronic paranasal sinus disease.         DX-CHEST-PORTABLE (1 VIEW)   Final Result      1.  Persistent elevation LEFT hemidiaphragm with LEFT midlung scar again seen.   2.  No pneumonia or pulmonary edema.          Problem List  1.  Suicidal ideation- continue with plan for psychiatric care      Electronically signed by: Jorje Desir M.D., 4/18/2022 10:50 AM

## 2022-04-18 NOTE — DISCHARGE PLANNING
"Spoke with Tracy Bah -[phone]. who states they have no record of [facility] with [facility] & he has to call [phone] to be registered.  Pt cannot remember  Any friends' phone numbers since losing his wallet/ID/Credit & Bank cards  & phone while under influence at [facility].    Pt aplogizes for his behavior on arrival.\"I'm sorry I was like that. They gave me meth. I don't take meth.\"  Old IV tracks may be evident in pat antecubitals.Pt is no longer holdable \"I must have been dosed. I don't remember anything. [facility] Denies any hx of suicide.  Denies [facility] or half-way \"except for a DUI years ago\".  Denies SI now. NO DTS at this time.  He has no [place].  He has no [facility] connection.  Will present to [facility] for social serv assistance.  [facility] provided cab voucher will walk pt out.    "

## 2022-04-18 NOTE — CONSULTS
"[facility]   TRIAGE ASSESSMENT    Name: Mohsen Adler  MRN: [id number]  : 1950  Age: 71 y.o.  Date of assessment: 2022  PCP: Pcp Pt States None  Persons in attendance: Patient  Patient Location: [facility]    CHIEF COMPLAINT/PRESENTING ISSUE (as stated by pt): \"I'm just waiting to die.... dumb-ass questions!...\"   Chief Complaint   Patient presents with   • ALOC     Aox1, not responding to questions appropriately and verbally aggressive with questions, states has been x2 days since has had any ETOH and will not say how much he drinks, pt smells like ETOH, states is not homeless, drifts off to sleep and will wake with verbal stimulation     CURRENT LIVING SITUATION/SOCIAL SUPPORT/FINANCIAL RESOURCES:   Patient Income: Social Security. \"It's enough to get by.... to eat on.\"   Employment, duration, stability: Not applicable.   Education: I did not ask.    Residence location: [place]  Next:[employer]. 35 acres in family. Later: \"I changed my mind now. I'm just going to end it.... and that will be peace of mind.\"   Residence type: An apartment. \"I let it go....\" He refuses to tell me how he got to [facility], or whether he drove.   Pt lives with: Alone.    Food resources: SS.    SOGI: \"None of your god-damned business... got a mian--you're a man!... This fucking country's gone to [facility].\" Sighs.   Partner; length of relationship; quality of relationship: \"Nope.... I don't want one.\"   Children, their ages and location: None.     Best friend; length of time known; location; last contact: \"I don't have any.\"     Closest family, extent of support according to pt: \"Nobody... They're almost all dead so it doesn't really matter.\"    BEHAVIORAL HEALTH/SUBSTANCE USE TREATMENT HISTORY  Does patient/parent report a history of prior behavioral health/substance use treatment for patient?   Yes:    Dates Level of Care Facilty/Provider Diagnosis/Problem Medications   8 years  " "[facility]pt[facility] inpt [facility], location refused \"None of your God-damn business.\"  Morden (?); Trazodone; Seroquel; Melatonin; Pain-killer mood stabilizer. Just started.                                  SAFETY ASSESSMENT - SELF  Does patient acknowledge current or past symptoms of dangerousness to self or is previous history noted? Yes.  Does parent/significant other report patient has current or past symptoms of dangerousness to self? N\A  Does presenting problem suggest symptoms of dangerousness to self? Yes:     Past Current    Suicidal Thoughts: []  [x]    Suicidal Plans: []  [x]    Suicidal Intent: []  [x]    Suicide Attempts: []  []    Self-Injury []  []       For any boxes checked above, provide detail: plans to cut the femoral artery. Says he is \"completely\" suicidal.     History of suicide by family member: yes - Cousin, Rosa; Uncle Marcin.  History of suicide by friend/significant other: yes -\"It's over.... IT'S OVER!! ... I ain't answering any more of your dumb-ass questions. I ain't answering shit for you.\"   Recent change in frequency/specificity/intensity of suicidal thoughts or self-harm behavior? So it would appear.    Pt declined to answer further questions. Information below was provided earlier, or was obtained by observation.      Current access to firearms, medications, or other identified means of suicide/self-harm?   If yes, willing to restrict access to means of suicide/self-harm? Pt declined to answer further questions.   Protective factors present: Pt declined to answer further questions.     SAFETY ASSESSMENT - OTHERS  Pt declined to answer further questions.   Does patient acknowledge current or past symptoms of aggressive behavior or risk to others or is previous history noted?   Does parent/significant other report patient has current or past symptoms of aggressive behavior or risk to others?    Does presenting problem suggest symptoms of dangerousness to others?     LEGAL HISTORY  Pt declined to " answer further questions.   Does patient acknowledge history of arrest/penitentiary/longterm or is previous history noted?     Crisis Safety Plan completed and copy given to patient? no    ABUSE/NEGLECT SCREENING  Pt declined to answer further questions.   Does patient report feeling “unsafe” in his/her home, or afraid of anyone?   Does patient report any history of physical, sexual, or emotional abuse?    Does parent or significant other report any of the above? N\A  Is there evidence of neglect by self?  No. Not really. It sounds as though he has participated to this point in ongoing care through the [facility] somewhere.  Is there evidence of neglect by a caregiver? No.  Does the patient/parent report any history of CPS/APS/police involvement related to suspected abuse/neglect or domestic violence? No.  Based on the information provided during the current assessment, is a mandated report of suspected abuse/neglect being made?  No    SUBSTANCE USE SCREENING   Pt declined to answer further questions. By hx, pt has a great problem with alcohol.    MENTAL STATUS   Participation: Active verbal participation, Limited verbal participation, Attentive, Engaged, Guarded, Defensive, Resistant and Feisty: verbally aggressive but not in a hostile manner.  Grooming: Good and Casual . Hair and beard appear recently groomed.   Orientation: Not assessed, but by appearances, pt is oriented.  Behavior: Calm, Agitated and Hypoactive.  Eye contact: complaining the light hurts his eyes. Poor.  Mood: Depressed, Angry and Irritable.  Affect: Constricted, Congruent with content and Angry.  Thought process: Logical, Goal-directed and appears WNL.  Thought content: Within normal limits and perhaps not, given his claimed meds.  Speech: Rate within normal limits, Volume within normal limits, Hypotalkative and some mumbling. (He is missing his bottom dentures.)   Perception: Within normal limits, at least by appearances.  Memory:  No gross evidence of memory  deficits.  Insight: Adequate.  Judgment:  Limited  Other:    Collateral information: limited. Pt reported that he had signed permission for the [facility] to exchange info with any care provider.   Source:  [] Significant other present in person:   [] Significant other by telephone  [] [facility]   [x] [facility] Nursing Staff  [x] [facility] Medical Record  [] Other:     [x] Unable to complete full assessment due to:  [] Acute intoxication  [x] Patient declined to participate/engage  [] Patient verbally unresponsive  [] Significant cognitive deficits  [] Significant perceptual distortions or behavioral disorganization  [] Other:      CLINICAL IMPRESSIONS:  Primary:  Mood disorder, unspecified.  Rule out: cognitive disorder, unspecified.    Secondary: By report of excessive use: Alcohol Use Disorder, severe.       IDENTIFIED NEEDS/PLAN:  [Trigger DISPOSITION list for any items marked]    [x]  Imminent safety risk - self [] Imminent safety risk - others   []  Acute substance withdrawal []  Psychosis/Impaired reality testing   [x]  Mood/anxiety [x]  Substance use/Addictive behavior   [x]  Maladaptive behaviro []  Parent/child conflict   []  Family/Couples conflict [x]  Biomedical   [x]  Housing [x]  Financial   []   Legal  Occupational/Educational   []  Domestic violence []  Other:      Recommended Plan of Care:   Recommendations, Including Observation Level:  Patient is to transfer to a community inpatient psychiatric treatment facility when medically stable and after a bed has become available.  Care of patient is actively being addressed by the Legal Hold and [facility] Emergency Department.    Legal Hold:  I will initiate Legal Hold.    Observation: Via 1:1 sitter. Patient is currently at ongoing risk; sitter is needed.  Continue level of observation of patient in accord with the Goodman Suicide Severity Rating Scale (C-SSRS) assessment completed by [facility] ED RN every shift.     Phone: Patient may not have access to  telephone.   Visitors: Pt may not talk to visitors.  Personal belongings: Patient may not have access to personal belongings.     Has the Recommended Plan of Care/Level of Observation been reviewed with the patient's assigned nurse? yes    Does patient/parent or guardian express agreement with the above plan? Unknown. Pt declined to answer further questions.      Referral appointment(s) scheduled? N\A    Alert team only:   I have discussed findings and recommendations with Dr. Toledo who is in agreement with these recommendations.     Referral information sent to the following outpatient community providers : Not applicable.     Referral information to be sent to the following inpatient community providers: [facility]    If applicable : Referred to  Alert Team for legal hold follow up at (time): 1900.    Fermin Tierney, Ph.D.  4/17/2022

## 2022-04-18 NOTE — ED NOTES
Pt AOx4, states he does not do meth, does not know how he was positive for meth, feels better after eating this morning, does not feel suicidal, await psych.

## 2022-04-18 NOTE — ED PROVIDER NOTES
ED Provider Note    Patient has been seen by behavioral health, found to be at risk to self.  He is suicidal, current plan to sever his femoral artery.  Patient has been placed on psychiatric hold, he is medically clear for transfer to psychiatric facility when bed becomes available

## 2022-04-18 NOTE — DISCHARGE PLANNING
[facility] ED Behavioral Health Fax Referral      [facility] ED Behavioral Health Alert Team:  [phone]    Referral: Legal Hold    Intervention: Patient referral to [facility] inpatient  facillity    Legal Hold Initiated: Date: 4/17/22 Time: 1853    Patient’s Insurance Listed on Face Sheet: Medicare/[facility]    Referrals sent to: [facility], [facility], [facility], [facility]    Referrals faxed by WALTER Thomas    This referral contains the following information:  1) Face sheet __x__  2) Page 1 and Page 2 of Legal Hold __x__  3) Alert Team Assessment/Psych Assessment _x___  4) Head to toe physical exam _x___  5) Urine Drug Screen __x__  6) Blood Alcohol _x___  7) Vital signs _x___  8) Pregnancy test when applicable ___  9) Medications list x____  10) Covid screening ____    Plan: Patient will transfer to mental health facility once acceptance is obtained    Received a call from [facility] and no available beds at this time; will pend referral.

## 2022-04-21 ENCOUNTER — APPOINTMENT (OUTPATIENT)
Dept: RADIOLOGY | Facility: MEDICAL CENTER | Age: 72
End: 2022-04-21
Attending: EMERGENCY MEDICINE
Payer: COMMERCIAL

## 2022-04-21 ENCOUNTER — HOSPITAL ENCOUNTER (EMERGENCY)
Facility: MEDICAL CENTER | Age: 72
End: 2022-04-21
Attending: EMERGENCY MEDICINE
Payer: COMMERCIAL

## 2022-04-21 ENCOUNTER — HOSPITAL ENCOUNTER (EMERGENCY)
Facility: MEDICAL CENTER | Age: 72
End: 2022-04-23
Attending: EMERGENCY MEDICINE
Payer: COMMERCIAL

## 2022-04-21 VITALS
DIASTOLIC BLOOD PRESSURE: 87 MMHG | TEMPERATURE: 98.5 F | RESPIRATION RATE: 20 BRPM | SYSTOLIC BLOOD PRESSURE: 128 MMHG | WEIGHT: 180 LBS | HEART RATE: 80 BPM | HEIGHT: 69 IN | BODY MASS INDEX: 26.66 KG/M2 | OXYGEN SATURATION: 92 %

## 2022-04-21 DIAGNOSIS — R45.851 SUICIDAL IDEATION: ICD-10-CM

## 2022-04-21 DIAGNOSIS — R07.9 CHEST PAIN, UNSPECIFIED TYPE: ICD-10-CM

## 2022-04-21 LAB
ALBUMIN SERPL BCP-MCNC: 3.7 G/DL (ref 3.2–4.9)
ALBUMIN/GLOB SERPL: 1.4 G/DL
ALP SERPL-CCNC: 104 U/L (ref 30–99)
ALT SERPL-CCNC: 27 U/L (ref 2–50)
ANION GAP SERPL CALC-SCNC: 17 MMOL/L (ref 7–16)
AST SERPL-CCNC: 36 U/L (ref 12–45)
BASOPHILS # BLD AUTO: 0.7 % (ref 0–1.8)
BASOPHILS # BLD: 0.06 K/UL (ref 0–0.12)
BILIRUB SERPL-MCNC: 0.2 MG/DL (ref 0.1–1.5)
BUN SERPL-MCNC: 10 MG/DL (ref 8–22)
CALCIUM SERPL-MCNC: 8.5 MG/DL (ref 8.5–10.5)
CHLORIDE SERPL-SCNC: 104 MMOL/L (ref 96–112)
CO2 SERPL-SCNC: 17 MMOL/L (ref 20–33)
CREAT SERPL-MCNC: 0.8 MG/DL (ref 0.5–1.4)
D DIMER PPP IA.FEU-MCNC: 0.46 UG/ML (FEU) (ref 0–0.5)
EKG IMPRESSION: NORMAL
EOSINOPHIL # BLD AUTO: 0.57 K/UL (ref 0–0.51)
EOSINOPHIL NFR BLD: 7.1 % (ref 0–6.9)
ERYTHROCYTE [DISTWIDTH] IN BLOOD BY AUTOMATED COUNT: 51.7 FL (ref 35.9–50)
ETHANOL BLD-MCNC: 22.7 MG/DL (ref 0–10)
GFR SERPLBLD CREATININE-BSD FMLA CKD-EPI: 96 ML/MIN/1.73 M 2
GLOBULIN SER CALC-MCNC: 2.6 G/DL (ref 1.9–3.5)
GLUCOSE SERPL-MCNC: 87 MG/DL (ref 65–99)
HCT VFR BLD AUTO: 35.4 % (ref 42–52)
HGB BLD-MCNC: 11.9 G/DL (ref 14–18)
IMM GRANULOCYTES # BLD AUTO: 0.03 K/UL (ref 0–0.11)
IMM GRANULOCYTES NFR BLD AUTO: 0.4 % (ref 0–0.9)
LIPASE SERPL-CCNC: 50 U/L (ref 11–82)
LYMPHOCYTES # BLD AUTO: 2.13 K/UL (ref 1–4.8)
LYMPHOCYTES NFR BLD: 26.4 % (ref 22–41)
MCH RBC QN AUTO: 31.4 PG (ref 27–33)
MCHC RBC AUTO-ENTMCNC: 33.6 G/DL (ref 33.7–35.3)
MCV RBC AUTO: 93.4 FL (ref 81.4–97.8)
MONOCYTES # BLD AUTO: 0.84 K/UL (ref 0–0.85)
MONOCYTES NFR BLD AUTO: 10.4 % (ref 0–13.4)
NEUTROPHILS # BLD AUTO: 4.43 K/UL (ref 1.82–7.42)
NEUTROPHILS NFR BLD: 55 % (ref 44–72)
NRBC # BLD AUTO: 0 K/UL
NRBC BLD-RTO: 0 /100 WBC
PLATELET # BLD AUTO: 283 K/UL (ref 164–446)
PMV BLD AUTO: 9.3 FL (ref 9–12.9)
POTASSIUM SERPL-SCNC: 3.9 MMOL/L (ref 3.6–5.5)
PROT SERPL-MCNC: 6.3 G/DL (ref 6–8.2)
RBC # BLD AUTO: 3.79 M/UL (ref 4.7–6.1)
SODIUM SERPL-SCNC: 138 MMOL/L (ref 135–145)
TROPONIN T SERPL-MCNC: 9 NG/L (ref 6–19)
WBC # BLD AUTO: 8.1 K/UL (ref 4.8–10.8)

## 2022-04-21 PROCEDURE — 85379 FIBRIN DEGRADATION QUANT: CPT

## 2022-04-21 PROCEDURE — 99285 EMERGENCY DEPT VISIT HI MDM: CPT

## 2022-04-21 PROCEDURE — 36415 COLL VENOUS BLD VENIPUNCTURE: CPT

## 2022-04-21 PROCEDURE — 84484 ASSAY OF TROPONIN QUANT: CPT

## 2022-04-21 PROCEDURE — 302970 POC BREATHALIZER: Performed by: EMERGENCY MEDICINE

## 2022-04-21 PROCEDURE — 93005 ELECTROCARDIOGRAM TRACING: CPT | Performed by: EMERGENCY MEDICINE

## 2022-04-21 PROCEDURE — 700102 HCHG RX REV CODE 250 W/ 637 OVERRIDE(OP): Performed by: EMERGENCY MEDICINE

## 2022-04-21 PROCEDURE — 83690 ASSAY OF LIPASE: CPT

## 2022-04-21 PROCEDURE — 700101 HCHG RX REV CODE 250: Performed by: EMERGENCY MEDICINE

## 2022-04-21 PROCEDURE — A9270 NON-COVERED ITEM OR SERVICE: HCPCS | Performed by: EMERGENCY MEDICINE

## 2022-04-21 PROCEDURE — 82077 ASSAY SPEC XCP UR&BREATH IA: CPT

## 2022-04-21 PROCEDURE — 71045 X-RAY EXAM CHEST 1 VIEW: CPT

## 2022-04-21 PROCEDURE — 85025 COMPLETE CBC W/AUTO DIFF WBC: CPT

## 2022-04-21 PROCEDURE — 96365 THER/PROPH/DIAG IV INF INIT: CPT

## 2022-04-21 PROCEDURE — 700105 HCHG RX REV CODE 258: Performed by: EMERGENCY MEDICINE

## 2022-04-21 PROCEDURE — 80053 COMPREHEN METABOLIC PANEL: CPT

## 2022-04-21 RX ORDER — FAMOTIDINE 20 MG/1
20 TABLET, FILM COATED ORAL DAILY
Qty: 30 TABLET | Refills: 0 | Status: SHIPPED | OUTPATIENT
Start: 2022-04-21 | End: 2022-04-23

## 2022-04-21 RX ORDER — NAPROXEN 250 MG/1
500 TABLET ORAL 2 TIMES DAILY PRN
Status: DISCONTINUED | OUTPATIENT
Start: 2022-04-21 | End: 2022-04-23 | Stop reason: HOSPADM

## 2022-04-21 RX ORDER — ACETAMINOPHEN 500 MG
1000 TABLET ORAL ONCE
Status: DISCONTINUED | OUTPATIENT
Start: 2022-04-21 | End: 2022-04-21

## 2022-04-21 RX ORDER — ACETAMINOPHEN 500 MG
1000 TABLET ORAL ONCE
Status: COMPLETED | OUTPATIENT
Start: 2022-04-21 | End: 2022-04-21

## 2022-04-21 RX ORDER — ALUMINA, MAGNESIA, AND SIMETHICONE 2400; 2400; 240 MG/30ML; MG/30ML; MG/30ML
20 SUSPENSION ORAL ONCE
Status: COMPLETED | OUTPATIENT
Start: 2022-04-21 | End: 2022-04-21

## 2022-04-21 RX ORDER — GAUZE BANDAGE 2" X 2"
100 BANDAGE TOPICAL ONCE
Status: COMPLETED | OUTPATIENT
Start: 2022-04-21 | End: 2022-04-21

## 2022-04-21 RX ORDER — OMEPRAZOLE 20 MG/1
20 CAPSULE, DELAYED RELEASE ORAL DAILY
Status: DISCONTINUED | OUTPATIENT
Start: 2022-04-22 | End: 2022-04-23 | Stop reason: HOSPADM

## 2022-04-21 RX ORDER — FAMOTIDINE 20 MG/1
20 TABLET, FILM COATED ORAL ONCE
Status: COMPLETED | OUTPATIENT
Start: 2022-04-21 | End: 2022-04-21

## 2022-04-21 RX ORDER — ACETAMINOPHEN 500 MG
1000 TABLET ORAL EVERY 8 HOURS PRN
Status: DISCONTINUED | OUTPATIENT
Start: 2022-04-21 | End: 2022-04-23 | Stop reason: HOSPADM

## 2022-04-21 RX ORDER — MIDAZOLAM HYDROCHLORIDE 1 MG/ML
0.5 INJECTION INTRAMUSCULAR; INTRAVENOUS
Status: DISCONTINUED | OUTPATIENT
Start: 2022-04-21 | End: 2022-04-23 | Stop reason: HOSPADM

## 2022-04-21 RX ADMIN — THERA TABS 1 TABLET: TAB at 01:06

## 2022-04-21 RX ADMIN — KETAMINE HYDROCHLORIDE 25 MG: 10 INJECTION, SOLUTION INTRAMUSCULAR; INTRAVENOUS at 23:30

## 2022-04-21 RX ADMIN — ACETAMINOPHEN 1000 MG: 500 TABLET ORAL at 01:05

## 2022-04-21 RX ADMIN — ALUMINUM HYDROXIDE, MAGNESIUM HYDROXIDE, AND DIMETHICONE 20 ML: 400; 400; 40 SUSPENSION ORAL at 01:06

## 2022-04-21 RX ADMIN — Medication 100 MG: at 01:06

## 2022-04-21 RX ADMIN — FAMOTIDINE 20 MG: 20 TABLET, FILM COATED ORAL at 01:06

## 2022-04-21 ASSESSMENT — FIBROSIS 4 INDEX
FIB4 SCORE: 1.69
FIB4 SCORE: 2.31

## 2022-04-21 ASSESSMENT — PAIN DESCRIPTION - PAIN TYPE
TYPE: ACUTE PAIN
TYPE: CHRONIC PAIN

## 2022-04-21 NOTE — ED TRIAGE NOTES
MAICOL ESCALANTE from [facility] for complaints of sharp chest pain and cough. Pt reports drinking 6-7 drinks today. Endorses hx of TIA, COPD, and cancer to which he is not receiving treatment. Chest pain protocol ordered. Pending EKG. Labs drawn. EMS placed 22R hand. Pt changed into gown. Chart up for ERP.

## 2022-04-21 NOTE — ED NOTES
Patient discharged from [facility] ED to home with self. Discharge teaching completed at bedside and patient signature obtained. All questions and concerns addressed. PIV removed. All pt belongings with pt at time of discharge.

## 2022-04-21 NOTE — ED NOTES
Pt refusing lab draw for second troponin. Pt educated on importance of redraw. Pt continues to refuse. ERP updated.

## 2022-04-21 NOTE — DISCHARGE INSTRUCTIONS
You were seen in the ED for chest pain. Your physical exam, EKG, chest X-ray and bloodwork evaluating your heart were performed. All of these tests were reassuring. It does not appear that you had a heart attack. The cause of your pain is unclear, however it does not appear to be dangerous at this time. At this time it is safe for you to go home.    Your symptoms may be from irritation of your stomach or esophagus.    You are being prescribed Pepcid (famotidine). This is also available over the counter. This can be very effective at gastritis and acid reflux symptoms. However, if you have to take it daily for more than a couple weeks, the body becomes used to it and you will need to switch to Prilosec (omeprazole). This medication is also available over the counter.     You may also take Tums or Maalox as needed for your symptoms. If your symptoms do not improve, you may need to see a gastroenterologist for a procedure where they use a camera to look at your esophagus and stomach. This procedure is called an endoscopy.    Your oxygen counts appear to dip while you sleep.  You may benefit from obtaining a sleep study.  This is coordinated through primary care physician.    Return to the ED if you develop chest pain, lightheadedness, shortness of breath, if your pain does not improve, or for any other new or concerning symptoms.

## 2022-04-21 NOTE — ED PROVIDER NOTES
"      ED Provider Note    Scribed for Lawrence Montez M.D. by Leigh Batista. 4/21/2022,  12:30 AM.    Means of Arrival: EMS  History obtained from: Patient  History limited by: None    CHIEF COMPLAINT  Chief Complaint   Patient presents with    Chest Pain     Started today, gets worse with cough, endorses drinking 6-7 drinks today       HPI  Mohsen Adler is a 71 y.o. male who presents to the Emergency Department via EMS with chest pain onset today. The patient reports he has been experiencing intermittent chest pain for 8 months, and tonight the \"jolting\" pain radiates from his chest to his left shoulder. He also endorses shortness of breath, dyspnea, and bilateral calf pain and swelling.  He denies any numbness. He adds that he has difficulty walking because he can not maintain his balance. He has a history of TIA and cancer that started in his stomach 3.5 years ago and is no longer receiving chemo treatment. He adds his mother and sister also had the same cancer. He also has a history of alcohol abuse.     REVIEW OF SYSTEMS  CARDIOVASCULAR:  Radiating chest discomfort. Bilateral calf pain and swelling.   RESPIRATORY:  Chest pain. Shortness of breath. Dyspnea.   NEUROLOGIC:   Difficulty maintaining balance.   See HPI for further details.   All other systems are negative.     PAST MEDICAL HISTORY  Past Medical History:   Diagnosis Date    Chronic obstructive pulmonary disease (HCC)        FAMILY HISTORY  Family history includes cancer in his mother and sister.     SOCIAL HISTORY   reports that he has been smoking cigarettes. He has been smoking about 0.50 packs per day. He has never used smokeless tobacco. He reports current alcohol use. He reports that he does not use drugs.    SURGICAL HISTORY  No past surgical history noted.     CURRENT MEDICATIONS  Home Medications    **Home medications have not yet been reviewed for this encounter**         ALLERGIES  Allergies   Allergen Reactions    Seroquel [Quetiapine]  " "    Pt states that it exacerbates his restless leg syndrome.        PHYSICAL EXAM  VITAL SIGNS: /69   Pulse 77   Temp 37 °C (98.6 °F) (Temporal)   Resp 18   Ht 1.753 m (5' 9\")   Wt 81.6 kg (180 lb)   SpO2 93%   BMI 26.58 kg/m²    Gen: Alert, no acute distress  HEENT: ATNC  Eyes: PERRL, EOMI, normal conjunctiva.   Neck: trachea midline  Resp: no respiratory distress, clear to auscultation bilaterally, no wheezes, rales, or crackles  CV: No JVD, regular rate and rhythm, no murmurs, gallops, or rubs.  Equal radial pulses.  Abd: non-distended  Ext: Left calf tenderness..  1+ pitting edema bilateral lower extremities.  Psych: normal mood  Neuro: speech fluent, moves all extremities, no focal neurologic deficits    DIAGNOSTIC STUDIES / PROCEDURES     EKG  Results for orders placed or performed during the hospital encounter of 22   EKG   Result Value Ref Range    Report       [facility] Emergency Dept.    Test Date:  2022  Pt Name:    ADRIEN FIGUEROA      Department: ER  MRN:        [id number]                      Room:       [facility]  Gender:     Male                         Technician: [id number]  :        1950                   Requested By:ER TRIAGE PROTOCOL  Order #:    [id number]                    Reading MD: Lawrence Montez    Measurements  Intervals                                Axis  Rate:       82                           P:          56  MS:         152                          QRS:        44  QRSD:       90                           T:          49  QT:         368  QTc:        430    Interpretive Statements  SINUS RHYTHM  Compared to ECG 2022 10:17:53  No significant changes  Electronically Signed On 2022 0:58:01 PDT by Lawrence Montez          LABS  Labs Reviewed   CBC WITH DIFFERENTIAL - Abnormal; Notable for the following components:       Result Value    RBC 3.79 (*)     Hemoglobin 11.9 (*)     Hematocrit 35.4 (*)     MCHC 33.6 (*)     RDW 51.7 (*)     " Eosinophils 7.10 (*)     Eos (Absolute) 0.57 (*)     All other components within normal limits   COMP METABOLIC PANEL - Abnormal; Notable for the following components:    Co2 17 (*)     Anion Gap 17.0 (*)     Alkaline Phosphatase 104 (*)     All other components within normal limits   TROPONIN   D-DIMER   LIPASE   ESTIMATED GFR   TROPONIN     All labs reviewed by me.    RADIOLOGY  DX-CHEST-PORTABLE (1 VIEW)   Final Result         1.  No acute cardiopulmonary disease.   2.  Atherosclerosis        The radiologist’s interpretation of all radiology studies have been reviewed by me.    COURSE & MEDICAL DECISION MAKING  Pertinent Labs & Imaging studies reviewed. (See chart for details)    12:30 AM Patient seen and examined at bedside. Patient will be treated with Theragran 1 tablet, Vitamin B 100 mg, Pepcid 20 mg, Mylanta 20 ml, and Tylenol 1000 mg for his symptoms.     PPE Note: I verified that the patient was wearing a mask and I was wearing appropriate PPE every time I entered the room. The patient's mask was on the patient at all times during my encounter except for a brief view of the oropharynx.     Scribe remained outside the patient's room and did not have any contact with the patient for the duration of patient encounter.     Medical Decision Making:  The patient presented with symptoms concerning for cardiac chest pain. The EKG was not ischemic and the patient's initial workup was negative for MI, pneumothorax, heart failure. The patient's symptoms, labs and vital signs are not consistent with a pulmonary embolism. The chest x-ray and symptoms are not suggestive of an aortic dissection.  Patient recommended for second troponin but declines.  Given that this is not a new symptom for him, I believe it is reasonable to stop the 1 troponin.  Patient was advised that there is some chance of this being ACS.  He is agreeable with plan for discharge.  Based on the patient's clinical picture, an admission for inpatient  stress test was not indicated and the patient will follow up with their outpatient provider for follow up evaluation. The patient was provided with return precautions.  Will trial Pepcid for possible alcoholic gastritis/GERD.     The patient will return for new or worsening symptoms and is stable at the time of discharge.    The patient is referred to a primary physician for blood pressure management, diabetic screening, and for all other preventative health concerns.    DISPOSITION:  Patient will be discharged home in stable condition.    FOLLOW UP:  [facility], Emergency Dept  [address] [id number] [phone]    If symptoms worsen    [facility] - Behavioral Health Counseling  [address] [facility] [address]  [phone]        [facility] - Primary Care  [address] [facility] [address]  [phone]        OUTPATIENT MEDICATIONS:  Discharge Medication List as of 4/21/2022  2:27 AM        START taking these medications    Details   famotidine (PEPCID) 20 MG Tab Take 1 Tablet by mouth every day., Disp-30 Tablet, R-0, Normal               FINAL IMPRESSION  1. Chest pain, unspecified type            I, Leigh Batista (Scribe), am scribing for, and in the presence of, Lawrence Montez M.D..    Electronically signed by: Leigh Batista (Rukhsana), 4/21/2022    ILawrence M.D. personally performed the services described in this documentation, as scribed by Leigh Batista in my presence, and it is both accurate and complete. C    The note accurately reflects work and decisions made by me.  Lawrence Montez M.D.  4/21/2022  3:33 AM    This dictation was created using voice recognition software. The accuracy of the dictation is limited to the abilities of the software. I expect there may be some errors of grammar and possibly content. The nursing notes were reviewed and certain aspects of this information were incorporated into this  note.

## 2022-04-22 LAB
ALBUMIN SERPL BCP-MCNC: 3.5 G/DL (ref 3.2–4.9)
ALBUMIN/GLOB SERPL: 1.3 G/DL
ALP SERPL-CCNC: 110 U/L (ref 30–99)
ALT SERPL-CCNC: 19 U/L (ref 2–50)
AMPHET UR QL SCN: NEGATIVE
ANION GAP SERPL CALC-SCNC: 10 MMOL/L (ref 7–16)
APPEARANCE UR: CLEAR
AST SERPL-CCNC: 22 U/L (ref 12–45)
BARBITURATES UR QL SCN: NEGATIVE
BASOPHILS # BLD AUTO: 0.9 % (ref 0–1.8)
BASOPHILS # BLD: 0.08 K/UL (ref 0–0.12)
BENZODIAZ UR QL SCN: NEGATIVE
BILIRUB SERPL-MCNC: 0.5 MG/DL (ref 0.1–1.5)
BILIRUB UR QL STRIP.AUTO: NEGATIVE
BUN SERPL-MCNC: 10 MG/DL (ref 8–22)
BZE UR QL SCN: NEGATIVE
CALCIUM SERPL-MCNC: 9 MG/DL (ref 8.4–10.2)
CANNABINOIDS UR QL SCN: NEGATIVE
CHLORIDE SERPL-SCNC: 103 MMOL/L (ref 96–112)
CO2 SERPL-SCNC: 23 MMOL/L (ref 20–33)
COLOR UR: YELLOW
COMMENT 1642: NORMAL
CREAT SERPL-MCNC: 0.87 MG/DL (ref 0.5–1.4)
EOSINOPHIL # BLD AUTO: 0.44 K/UL (ref 0–0.51)
EOSINOPHIL NFR BLD: 5.1 % (ref 0–6.9)
ERYTHROCYTE [DISTWIDTH] IN BLOOD BY AUTOMATED COUNT: 51.8 FL (ref 35.9–50)
GFR SERPLBLD CREATININE-BSD FMLA CKD-EPI: 93 ML/MIN/1.73 M 2
GLOBULIN SER CALC-MCNC: 2.8 G/DL (ref 1.9–3.5)
GLUCOSE SERPL-MCNC: 105 MG/DL (ref 65–99)
GLUCOSE UR STRIP.AUTO-MCNC: NEGATIVE MG/DL
HCT VFR BLD AUTO: 40.9 % (ref 42–52)
HGB BLD-MCNC: 13.7 G/DL (ref 14–18)
IMM GRANULOCYTES # BLD AUTO: 0.03 K/UL (ref 0–0.11)
IMM GRANULOCYTES NFR BLD AUTO: 0.3 % (ref 0–0.9)
KETONES UR STRIP.AUTO-MCNC: NEGATIVE MG/DL
LEUKOCYTE ESTERASE UR QL STRIP.AUTO: NEGATIVE
LYMPHOCYTES # BLD AUTO: 1.3 K/UL (ref 1–4.8)
LYMPHOCYTES NFR BLD: 14.9 % (ref 22–41)
MCH RBC QN AUTO: 31.4 PG (ref 27–33)
MCHC RBC AUTO-ENTMCNC: 33.5 G/DL (ref 33.7–35.3)
MCV RBC AUTO: 93.8 FL (ref 81.4–97.8)
METHADONE UR QL SCN: NEGATIVE
MICRO URNS: NORMAL
MONOCYTES # BLD AUTO: 0.75 K/UL (ref 0–0.85)
MONOCYTES NFR BLD AUTO: 8.6 % (ref 0–13.4)
NEUTROPHILS # BLD AUTO: 6.1 K/UL (ref 1.82–7.42)
NEUTROPHILS NFR BLD: 70.2 % (ref 44–72)
NITRITE UR QL STRIP.AUTO: NEGATIVE
NRBC # BLD AUTO: 0 K/UL
NRBC BLD-RTO: 0 /100 WBC
OPIATES UR QL SCN: NEGATIVE
OXYCODONE UR QL SCN: NEGATIVE
PCP UR QL SCN: NEGATIVE
PH UR STRIP.AUTO: 7.5 [PH] (ref 5–8)
PLATELET # BLD AUTO: 234 K/UL (ref 164–446)
PMV BLD AUTO: 10.4 FL (ref 9–12.9)
POTASSIUM SERPL-SCNC: 4.3 MMOL/L (ref 3.6–5.5)
PROPOXYPH UR QL SCN: NEGATIVE
PROT SERPL-MCNC: 6.3 G/DL (ref 6–8.2)
PROT UR QL STRIP: NEGATIVE MG/DL
RBC # BLD AUTO: 4.36 M/UL (ref 4.7–6.1)
RBC UR QL AUTO: NEGATIVE
SARS-COV+SARS-COV-2 AG RESP QL IA.RAPID: NOTDETECTED
SODIUM SERPL-SCNC: 136 MMOL/L (ref 135–145)
SP GR UR STRIP.AUTO: 1.01
SPECIMEN SOURCE: NORMAL
T4 FREE SERPL-MCNC: 1.22 NG/DL (ref 0.93–1.7)
TSH SERPL DL<=0.005 MIU/L-ACNC: 2.59 UIU/ML (ref 0.38–5.33)
WBC # BLD AUTO: 8.7 K/UL (ref 4.8–10.8)

## 2022-04-22 PROCEDURE — 80053 COMPREHEN METABOLIC PANEL: CPT

## 2022-04-22 PROCEDURE — 700102 HCHG RX REV CODE 250 W/ 637 OVERRIDE(OP): Performed by: EMERGENCY MEDICINE

## 2022-04-22 PROCEDURE — 85025 COMPLETE CBC W/AUTO DIFF WBC: CPT

## 2022-04-22 PROCEDURE — A9270 NON-COVERED ITEM OR SERVICE: HCPCS | Performed by: EMERGENCY MEDICINE

## 2022-04-22 PROCEDURE — 90837 PSYTX W PT 60 MINUTES: CPT | Performed by: SOCIAL WORKER

## 2022-04-22 PROCEDURE — 81003 URINALYSIS AUTO W/O SCOPE: CPT | Mod: XU

## 2022-04-22 PROCEDURE — 87426 SARSCOV CORONAVIRUS AG IA: CPT

## 2022-04-22 PROCEDURE — 82746 ASSAY OF FOLIC ACID SERUM: CPT

## 2022-04-22 PROCEDURE — 82607 VITAMIN B-12: CPT

## 2022-04-22 PROCEDURE — 84439 ASSAY OF FREE THYROXINE: CPT

## 2022-04-22 PROCEDURE — 80307 DRUG TEST PRSMV CHEM ANLYZR: CPT

## 2022-04-22 PROCEDURE — 84443 ASSAY THYROID STIM HORMONE: CPT

## 2022-04-22 RX ORDER — TRAZODONE HYDROCHLORIDE 50 MG/1
50 TABLET ORAL ONCE
Status: COMPLETED | OUTPATIENT
Start: 2022-04-22 | End: 2022-04-22

## 2022-04-22 RX ORDER — CHOLECALCIFEROL (VITAMIN D3) 125 MCG
5 CAPSULE ORAL NIGHTLY
Status: DISCONTINUED | OUTPATIENT
Start: 2022-04-22 | End: 2022-04-23 | Stop reason: HOSPADM

## 2022-04-22 RX ADMIN — ACETAMINOPHEN 1000 MG: 500 TABLET, FILM COATED ORAL at 01:04

## 2022-04-22 RX ADMIN — Medication 5 MG: at 20:34

## 2022-04-22 RX ADMIN — TRAZODONE HYDROCHLORIDE 50 MG: 50 TABLET ORAL at 20:34

## 2022-04-22 RX ADMIN — NAPROXEN 500 MG: 250 TABLET ORAL at 01:04

## 2022-04-22 ASSESSMENT — PAIN DESCRIPTION - PAIN TYPE
TYPE: ACUTE PAIN
TYPE: CHRONIC PAIN

## 2022-04-22 NOTE — ED NOTES
"Offered to give patient tylenol for his pain he declined. Reports \"I need something stronger.\"   "

## 2022-04-22 NOTE — ED NOTES
Report received from NOC RN, POC discussed, walking rounds completed, pt laying in bed, refusing to answer SI questions.  2 Warm blankets provided.  no s/s distress, call light within reach and will continue to monitor.

## 2022-04-22 NOTE — ED NOTES
Telesitter placed, called [facility] in telesitting, confirmed they are able to see patient and monitoring is in place.

## 2022-04-22 NOTE — DISCHARGE INSTRUCTIONS
You were evaluated today for suicidal ideation. Your physical exam and labs were reassuring. You were medically cleared in the emergency department, had a psychiatric evaluation performed and you are being discharged from the emergency department. Please follow all the recommendations set by your psychiatrist.    If you have thoughts of suicide, please seek help. This may be a family member, friend, mental health professional, suicide hotline, or any emergency department.     National Suicide Prevention Lifeline: 1-962.323.7783  Available 24hours a day, 7 days a week    Please return to the ED or seek medical attention if you develop:  Fever, severe headache, vomiting, thoughts of suicide or other concerning findings

## 2022-04-22 NOTE — ED NOTES
Assumed patient care and report received from Vijaya WATSON.    IV started by Wally WATSON via ultrasound guided

## 2022-04-22 NOTE — DISCHARGE PLANNING
Alert Team/Behavioral Health Note:    [facility] and was told needed doctor-[facility]doctor report. Bing Saucedo Phd, LCSW was contacted to let RS attending physician to talk to [facility]call [facility].

## 2022-04-22 NOTE — ED PROVIDER NOTES
"ED Provider Note      Means of Arrival: [facility]  History obtained from: Patient, medical record    CHIEF COMPLAINT  Chief Complaint   Patient presents with   • Suicidal Ideation     Pt is suicidal with the plan to \"cut femoral artery and bleed out\"        HPI  Mohsen Adler is a 71 y.o. male who presents  with suicidal ideation.  I saw him earlier in the day today for chest pain.  He reports his chest pain has improved but has chronic back pain.  This is unchanged from prior. he reports depression and feeling suicidal.  He stated he did not want to kill himself in front of a number of people 2011.  His plan is to cut his right femoral artery.  He denies any other new complaints    REVIEW OF SYSTEMS  CONSTITUTIONAL:  No fever.  CARDIOVASCULAR:  No chest discomfort.  RESPIRATORY:  No pleuritic chest pain.  GASTROINTESTINAL:  No abdominal pain.  See HPI for further details.   All other systems are negative.     PAST MEDICAL HISTORY  Past Medical History:   Diagnosis Date   • Chronic obstructive pulmonary disease (HCC)        FAMILY HISTORY  No family history on file.    SOCIAL HISTORY   reports that he has been smoking cigarettes. He has been smoking about 0.50 packs per day. He has never used smokeless tobacco. He reports current alcohol use. He reports that he does not use drugs.    SURGICAL HISTORY  History reviewed. No pertinent surgical history.    CURRENT MEDICATIONS  Home Medications     Reviewed by Vijaya Hurst R.N. (Registered Nurse) on 04/21/22 at 2222  Med List Status: <None>   Medication Last Dose Status   famotidine (PEPCID) 20 MG Tab  Active                ALLERGIES  Allergies   Allergen Reactions   • Seroquel [Quetiapine]      Pt states that it exacerbates his restless leg syndrome.        PHYSICAL EXAM  VITAL SIGNS: /77   Pulse 73   Temp 36.7 °C (98 °F) (Temporal)   Resp 18   Ht 1.753 m (5' 9\")   Wt 78.9 kg (174 lb)   SpO2 93%   BMI 25.70 kg/m²    Gen: Alert  HENT: ATNC  Eyes: " Normal conjunctiva  Neck: trachea midline  Resp: no respiratory distress, clear to auscultation bilaterally  CV: No JVD, RRR, no murmurs, rubs, gallops  Abd: non-distended, nontender  Ext: No deformities  Psych: Depressed mood  Neuro: speech fluent, moves all extremities, normal gait    LABS  Labs Reviewed   ETHYL ALCOHOL (BLOOD) - Abnormal; Notable for the following components:       Result Value    Diagnostic Alcohol 22.7 (*)     All other components within normal limits   URINE DRUG SCREEN   DIAGNOSTIC ALCOHOL   POC BREATHALIZER   POC BREATHALIZER          COURSE & MEDICAL DECISION MAKING  Pertinent Labs & Imaging studies reviewed. (See chart for details)    Review medical record demonstrates the patient's was seen recently for suicidal ideation with similar plan to cut his femoral artery.  He was placed onto a legal hold after being evaluated by psychiatry at that that time.  Given the patient's ongoing suicidal ideation.  He was again placed on legal hold prior to arrival.  The patient is medically cleared.    Given the patient's ongoing suicidal ideation, he was agreeable with plan for depression dose ketamine infusion.  He is requesting Mobic, however I do not see this on his prior medication list.  Will provide naproxen and acetaminophen for his chronic back pain.    10:58 PM  Patient admitted to ED observation for evaluation by the alert team, likely placement for suicidal ideation.    0600 patient signed out to Dr. Yu awaiting psychiatric evaluation and likely placement.        Appropriate PPE were worn at this encounter.     FINAL IMPRESSION  1. Suicidal ideation           DISPOSITION:  ED observation for psychiatric evaluation and likely placement.    FOLLOW UP:  [facility] - Behavioral Health Counseling  [address] [facility] [address]  [phone]        [facility] - Primary Care  [address] [address] [phone]        This dictation was  created using voice recognition software. The accuracy of the dictation is limited to the abilities of the software. I expect there may be some errors of grammar and possibly content. The nursing notes were reviewed and certain aspects of this information were incorporated into this note.

## 2022-04-22 NOTE — ED NOTES
Alejandro FRANK ([phone]) from [facility] states the Psychiatrist is requesting additional labs, CBC, CMP, TSH, B12 Folate, UA and covid swab.  Please fax results to [phone].  ERP updated, orders placed, Lab called for assistance, UA collected and Covid swab obtained.

## 2022-04-22 NOTE — ED NOTES
Patient verbally aggressive toward the nurse.      Patient refuses for this nurse to tell him his plan of care. Ketamine started as ordered to be infused over 40 minutes.

## 2022-04-22 NOTE — CONSULTS
Brief Behavioral Health Note:    Received confirmation that patient is medically cleared and ready for transfer to an inpatient psychiatric facility. Transfer process initiated.    Thank you    Bing Saucedo, Ph.D., [facility]

## 2022-04-22 NOTE — ED TRIAGE NOTES
"Chief Complaint   Patient presents with   • Suicidal Ideation     Pt is suicidal with the plan to \"cut femoral artery and bleed out\"       Pt was placed on Legal hold by [place]    /81   Pulse 77   Temp 36.6 °C (97.9 °F) (Temporal)   Resp 18   Ht 1.753 m (5' 9\")   Wt 78.9 kg (174 lb)   SpO2 92%   "

## 2022-04-22 NOTE — ED NOTES
Pt refused to take 0600 medications per order.  Also refused to place mask.  Water offered and asked if he needed to use urinal and denied.

## 2022-04-22 NOTE — ED NOTES
Patient requesting something trazodone and melatonin for sleep.  Reports he takes it as needed.  Will update MD.

## 2022-04-22 NOTE — CONSULTS
Brief Behavioral Health Note:    Informed by Danay Wu, Discharge Planning, that the [facility] is requesting a doc to doc meeting. Informed Dr. Bubba Yu to please contact [facility] psychiatrist Dr. Berkowitz [phone].    Thank you,    Bing Saucedo, Ph.D., [facility]

## 2022-04-23 VITALS
DIASTOLIC BLOOD PRESSURE: 78 MMHG | BODY MASS INDEX: 25.77 KG/M2 | WEIGHT: 174 LBS | OXYGEN SATURATION: 95 % | RESPIRATION RATE: 16 BRPM | SYSTOLIC BLOOD PRESSURE: 126 MMHG | TEMPERATURE: 97.2 F | HEIGHT: 69 IN | HEART RATE: 84 BPM

## 2022-04-23 LAB
FOLATE SERPL-MCNC: 13.9 NG/ML
VIT B12 SERPL-MCNC: 189 PG/ML (ref 211–911)

## 2022-04-23 PROCEDURE — 700102 HCHG RX REV CODE 250 W/ 637 OVERRIDE(OP): Performed by: EMERGENCY MEDICINE

## 2022-04-23 PROCEDURE — A9270 NON-COVERED ITEM OR SERVICE: HCPCS | Performed by: EMERGENCY MEDICINE

## 2022-04-23 RX ORDER — MELOXICAM 15 MG/1
15 TABLET ORAL DAILY
Status: SHIPPED | COMMUNITY
End: 2022-05-03

## 2022-04-23 RX ORDER — TRAZODONE HYDROCHLORIDE 50 MG/1
50 TABLET ORAL NIGHTLY
COMMUNITY

## 2022-04-23 RX ORDER — LANOLIN ALCOHOL/MO/W.PET/CERES
3 CREAM (GRAM) TOPICAL
COMMUNITY

## 2022-04-23 NOTE — ED NOTES
Pt requesting dinner - tv dinner offered. Pt up in bed eating.     One on one sitter outside of room. Pt in direct line of sight.

## 2022-04-23 NOTE — ED NOTES
Patient awake and vital signs stable. Given water and yogurt. Patient is cooperative but states that he wants to die. He seems sad and depressed.

## 2022-04-23 NOTE — DISCHARGE PLANNING
[facility] ALERT TEAM DISCHARGE PLANNING NOTE    Date:  04/22/22  Patient Name:  Mohsen Sanchez y.o. - Discharge Planning  MRN:  [id number]   YOB: 1950  ADMISSION DATE:  4/21/2022      Writer forwarded transfer packet for inpatient psychiatric care to the following community providers:  [facility], [facility], [facility]   Items  included in the transfer packet:   _x____Face Sheet   _x____Pages 1 and 2 of completed legal hold   __x___Alert Team/Psych Assessment   _____H&P   _x____UDS   _x____Blood Alcohol   _x____Vital signs   _____Pregnancy Test (if applicable)   _x____Medications List   _x____Covid Screen

## 2022-04-23 NOTE — ED PROVIDER NOTES
5:31 PM  Labs are resulted with the exception of folate and B12 which are send outs.  [facility], per a note recommending this by Bing, and he stated that [facility] to the [facility] would not be possible at this time given the hour.  We will consult [facility].  Pt will be signed out to oncoming ERP.    Results for orders placed or performed during the hospital encounter of 04/21/22   URINE DRUG SCREEN (TRIAGE)   Result Value Ref Range    Amphetamines Urine Negative Negative    Barbiturates Negative Negative    Benzodiazepines Negative Negative    Cocaine Metabolite Negative Negative    Methadone Negative Negative    Opiates Negative Negative    Oxycodone Negative Negative    Phencyclidine -Pcp Negative Negative    Propoxyphene Negative Negative    Cannabinoid Metab Negative Negative   ETHYL ALCOHOL (BLOOD)   Result Value Ref Range    Diagnostic Alcohol 22.7 (H) 0.0 - 10.0 mg/dL   CBC WITH DIFFERENTIAL   Result Value Ref Range    WBC 8.7 4.8 - 10.8 K/uL    RBC 4.36 (L) 4.70 - 6.10 M/uL    Hemoglobin 13.7 (L) 14.0 - 18.0 g/dL    Hematocrit 40.9 (L) 42.0 - 52.0 %    MCV 93.8 81.4 - 97.8 fL    MCH 31.4 27.0 - 33.0 pg    MCHC 33.5 (L) 33.7 - 35.3 g/dL    RDW 51.8 (H) 35.9 - 50.0 fL    Platelet Count 234 164 - 446 K/uL    MPV 10.4 9.0 - 12.9 fL    Neutrophils-Polys 70.20 44.00 - 72.00 %    Lymphocytes 14.90 (L) 22.00 - 41.00 %    Monocytes 8.60 0.00 - 13.40 %    Eosinophils 5.10 0.00 - 6.90 %    Basophils 0.90 0.00 - 1.80 %    Immature Granulocytes 0.30 0.00 - 0.90 %    Nucleated RBC 0.00 /100 WBC    Neutrophils (Absolute) 6.10 1.82 - 7.42 K/uL    Lymphs (Absolute) 1.30 1.00 - 4.80 K/uL    Monos (Absolute) 0.75 0.00 - 0.85 K/uL    Eos (Absolute) 0.44 0.00 - 0.51 K/uL    Baso (Absolute) 0.08 0.00 - 0.12 K/uL    Immature Granulocytes (abs) 0.03 0.00 - 0.11 K/uL    NRBC (Absolute) 0.00 K/uL   CMP   Result Value Ref Range    Sodium 136 135 - 145 mmol/L    Potassium 4.3 3.6 - 5.5 mmol/L    Chloride 103 96 -  112 mmol/L    Co2 23 20 - 33 mmol/L    Anion Gap 10.0 7.0 - 16.0    Glucose 105 (H) 65 - 99 mg/dL    Bun 10 8 - 22 mg/dL    Creatinine 0.87 0.50 - 1.40 mg/dL    Calcium 9.0 8.4 - 10.2 mg/dL    AST(SGOT) 22 12 - 45 U/L    ALT(SGPT) 19 2 - 50 U/L    Alkaline Phosphatase 110 (H) 30 - 99 U/L    Total Bilirubin 0.5 0.1 - 1.5 mg/dL    Albumin 3.5 3.2 - 4.9 g/dL    Total Protein 6.3 6.0 - 8.2 g/dL    Globulin 2.8 1.9 - 3.5 g/dL    A-G Ratio 1.3 g/dL   TSH   Result Value Ref Range    TSH 2.590 0.380 - 5.330 uIU/mL   URINALYSIS    Specimen: Urine   Result Value Ref Range    Color Yellow     Character Clear     Specific Gravity 1.015 <1.035    Ph 7.5 5.0 - 8.0    Glucose Negative Negative mg/dL    Ketones Negative Negative mg/dL    Protein Negative Negative mg/dL    Bilirubin Negative Negative    Nitrite Negative Negative    Leukocyte Esterase Negative Negative    Occult Blood Negative Negative    Micro Urine Req see below    SARS-COV Antigen TOO   Result Value Ref Range    SARS-CoV-2 Source Nasal Swab     SARS-COV ANTIGEN TOO NotDetected NotDetected   FREE THYROXINE   Result Value Ref Range    Free T-4 1.22 0.93 - 1.70 ng/dL   ESTIMATED GFR   Result Value Ref Range    GFR (CKD-EPI) 93 >60 mL/min/1.73 m 2   DIFFERENTIAL COMMENT   Result Value Ref Range    Comments-Diff see below

## 2022-04-23 NOTE — ED NOTES
Accepting Dr. Cook from [facility],  to set up transport to [facility]

## 2022-04-23 NOTE — ED PROVIDER NOTES
"Patient:Mohsen Adler  Patient : 1950  Patient MRN: [id number]  Patient PCP: Damian Pt States None    Admit Date: 2022  Discharge Date and Time: 22 8:59 AM  Discharge Diagnosis:   1. Suicidal ideation       Discharge Attending: Kathleen Mills M.D.  Discharge Service: ED Observation    ED Course  Mohsen is a 71 y.o. male who was evaluated at [facility] for suicidal ideation.  Patient was placed on a legal hold.  Patient has now been accepted to [facility] for further evaluation.  Patient threatened to cut his femoral artery.  He also has a history of depression.  He had a fairly uneventful stay here in the emergency department.  He continued be monitored closely with suicide precautions.  He has now been accepted and ready for transfer.  Prior to transfer the certification of the legal hold needed to be completed which I filled out this morning.    Discharge Exam:  /78   Pulse 84   Temp 36.2 °C (97.2 °F) (Temporal)   Resp 16   Ht 1.753 m (5' 9\")   Wt 78.9 kg (174 lb)   SpO2 95%   BMI 25.70 kg/m² .    Constitutional: Awake and alert. Nontoxic  HENT:  Grossly normal  Eyes: Grossly normal  Neck: Normal range of motion  Cardiovascular: Normal heart rate   Thorax & Lungs: No respiratory distress  Abdomen: Nontender  Skin:  No pathologic rash.   Extremities: Well perfused  Psychiatric: Affect normal    Labs  Results for orders placed or performed during the hospital encounter of 22   URINE DRUG SCREEN (TRIAGE)   Result Value Ref Range    Amphetamines Urine Negative Negative    Barbiturates Negative Negative    Benzodiazepines Negative Negative    Cocaine Metabolite Negative Negative    Methadone Negative Negative    Opiates Negative Negative    Oxycodone Negative Negative    Phencyclidine -Pcp Negative Negative    Propoxyphene Negative Negative    Cannabinoid Metab Negative Negative   ETHYL ALCOHOL (BLOOD)   Result Value Ref Range    Diagnostic Alcohol 22.7 (H) 0.0 - " 10.0 mg/dL   CBC WITH DIFFERENTIAL   Result Value Ref Range    WBC 8.7 4.8 - 10.8 K/uL    RBC 4.36 (L) 4.70 - 6.10 M/uL    Hemoglobin 13.7 (L) 14.0 - 18.0 g/dL    Hematocrit 40.9 (L) 42.0 - 52.0 %    MCV 93.8 81.4 - 97.8 fL    MCH 31.4 27.0 - 33.0 pg    MCHC 33.5 (L) 33.7 - 35.3 g/dL    RDW 51.8 (H) 35.9 - 50.0 fL    Platelet Count 234 164 - 446 K/uL    MPV 10.4 9.0 - 12.9 fL    Neutrophils-Polys 70.20 44.00 - 72.00 %    Lymphocytes 14.90 (L) 22.00 - 41.00 %    Monocytes 8.60 0.00 - 13.40 %    Eosinophils 5.10 0.00 - 6.90 %    Basophils 0.90 0.00 - 1.80 %    Immature Granulocytes 0.30 0.00 - 0.90 %    Nucleated RBC 0.00 /100 WBC    Neutrophils (Absolute) 6.10 1.82 - 7.42 K/uL    Lymphs (Absolute) 1.30 1.00 - 4.80 K/uL    Monos (Absolute) 0.75 0.00 - 0.85 K/uL    Eos (Absolute) 0.44 0.00 - 0.51 K/uL    Baso (Absolute) 0.08 0.00 - 0.12 K/uL    Immature Granulocytes (abs) 0.03 0.00 - 0.11 K/uL    NRBC (Absolute) 0.00 K/uL   CMP   Result Value Ref Range    Sodium 136 135 - 145 mmol/L    Potassium 4.3 3.6 - 5.5 mmol/L    Chloride 103 96 - 112 mmol/L    Co2 23 20 - 33 mmol/L    Anion Gap 10.0 7.0 - 16.0    Glucose 105 (H) 65 - 99 mg/dL    Bun 10 8 - 22 mg/dL    Creatinine 0.87 0.50 - 1.40 mg/dL    Calcium 9.0 8.4 - 10.2 mg/dL    AST(SGOT) 22 12 - 45 U/L    ALT(SGPT) 19 2 - 50 U/L    Alkaline Phosphatase 110 (H) 30 - 99 U/L    Total Bilirubin 0.5 0.1 - 1.5 mg/dL    Albumin 3.5 3.2 - 4.9 g/dL    Total Protein 6.3 6.0 - 8.2 g/dL    Globulin 2.8 1.9 - 3.5 g/dL    A-G Ratio 1.3 g/dL   TSH   Result Value Ref Range    TSH 2.590 0.380 - 5.330 uIU/mL   URINALYSIS    Specimen: Urine   Result Value Ref Range    Color Yellow     Character Clear     Specific Gravity 1.015 <1.035    Ph 7.5 5.0 - 8.0    Glucose Negative Negative mg/dL    Ketones Negative Negative mg/dL    Protein Negative Negative mg/dL    Bilirubin Negative Negative    Nitrite Negative Negative    Leukocyte Esterase Negative Negative    Occult Blood Negative Negative     Micro Urine Req see below    SARS-COV Antigen TOO   Result Value Ref Range    SARS-CoV-2 Source Nasal Swab     SARS-COV ANTIGEN TOO NotDetected NotDetected   VITAMIN B12   Result Value Ref Range    Vitamin B12 -True Cobalamin 189 (L) 211 - 911 pg/mL   FREE THYROXINE   Result Value Ref Range    Free T-4 1.22 0.93 - 1.70 ng/dL   FOLATE   Result Value Ref Range    Folate -Folic Acid 13.9 >4.0 ng/mL   ESTIMATED GFR   Result Value Ref Range    GFR (CKD-EPI) 93 >60 mL/min/1.73 m 2   DIFFERENTIAL COMMENT   Result Value Ref Range    Comments-Diff see below        Radiology  No orders to display         Medications:   Discharge Medication List as of 4/23/2022  8:58 AM      Patient is transferred to [facility] at 8:55 AM.  Patient is discharged from observation status for this transfer.    Discharge Condition: Stable    Electronically signed by: Kathleen Mills M.D., 4/23/2022 8:59 AM

## 2022-04-29 ENCOUNTER — HOSPITAL ENCOUNTER (INPATIENT)
Facility: MEDICAL CENTER | Age: 72
LOS: 2 days | DRG: 894 | End: 2022-05-01
Attending: STUDENT IN AN ORGANIZED HEALTH CARE EDUCATION/TRAINING PROGRAM | Admitting: STUDENT IN AN ORGANIZED HEALTH CARE EDUCATION/TRAINING PROGRAM
Payer: COMMERCIAL

## 2022-04-29 DIAGNOSIS — R41.82 ALTERED MENTAL STATUS, UNSPECIFIED ALTERED MENTAL STATUS TYPE: ICD-10-CM

## 2022-04-29 DIAGNOSIS — F10.931 ALCOHOL WITHDRAWAL SYNDROME, WITH DELIRIUM (HCC): Primary | ICD-10-CM

## 2022-04-29 PROBLEM — F10.930 ALCOHOL WITHDRAWAL, UNCOMPLICATED (HCC): Status: ACTIVE | Noted: 2022-04-29

## 2022-04-29 PROBLEM — E53.8 B12 DEFICIENCY: Status: ACTIVE | Noted: 2022-04-29

## 2022-04-29 LAB
ALBUMIN SERPL BCP-MCNC: 4.2 G/DL (ref 3.2–4.9)
ALBUMIN/GLOB SERPL: 1.4 G/DL
ALP SERPL-CCNC: 128 U/L (ref 30–99)
ALT SERPL-CCNC: 23 U/L (ref 2–50)
ANION GAP SERPL CALC-SCNC: 18 MMOL/L (ref 7–16)
AST SERPL-CCNC: 27 U/L (ref 12–45)
BASOPHILS # BLD AUTO: 0.8 % (ref 0–1.8)
BASOPHILS # BLD: 0.07 K/UL (ref 0–0.12)
BILIRUB SERPL-MCNC: 0.4 MG/DL (ref 0.1–1.5)
BUN SERPL-MCNC: 19 MG/DL (ref 8–22)
CALCIUM SERPL-MCNC: 8.9 MG/DL (ref 8.5–10.5)
CHLORIDE SERPL-SCNC: 101 MMOL/L (ref 96–112)
CO2 SERPL-SCNC: 16 MMOL/L (ref 20–33)
CREAT SERPL-MCNC: 0.92 MG/DL (ref 0.5–1.4)
EOSINOPHIL # BLD AUTO: 0.62 K/UL (ref 0–0.51)
EOSINOPHIL NFR BLD: 7 % (ref 0–6.9)
ERYTHROCYTE [DISTWIDTH] IN BLOOD BY AUTOMATED COUNT: 49.3 FL (ref 35.9–50)
GFR SERPLBLD CREATININE-BSD FMLA CKD-EPI: 90 ML/MIN/1.73 M 2
GLOBULIN SER CALC-MCNC: 2.9 G/DL (ref 1.9–3.5)
GLUCOSE SERPL-MCNC: 71 MG/DL (ref 65–99)
HCT VFR BLD AUTO: 39.9 % (ref 42–52)
HGB BLD-MCNC: 13.3 G/DL (ref 14–18)
IMM GRANULOCYTES # BLD AUTO: 0.03 K/UL (ref 0–0.11)
IMM GRANULOCYTES NFR BLD AUTO: 0.3 % (ref 0–0.9)
LYMPHOCYTES # BLD AUTO: 2.1 K/UL (ref 1–4.8)
LYMPHOCYTES NFR BLD: 23.8 % (ref 22–41)
MAGNESIUM SERPL-MCNC: 2.1 MG/DL (ref 1.5–2.5)
MCH RBC QN AUTO: 30.9 PG (ref 27–33)
MCHC RBC AUTO-ENTMCNC: 33.3 G/DL (ref 33.7–35.3)
MCV RBC AUTO: 92.6 FL (ref 81.4–97.8)
MONOCYTES # BLD AUTO: 0.98 K/UL (ref 0–0.85)
MONOCYTES NFR BLD AUTO: 11.1 % (ref 0–13.4)
NEUTROPHILS # BLD AUTO: 5.02 K/UL (ref 1.82–7.42)
NEUTROPHILS NFR BLD: 57 % (ref 44–72)
NRBC # BLD AUTO: 0 K/UL
NRBC BLD-RTO: 0 /100 WBC
PHOSPHATE SERPL-MCNC: 4.9 MG/DL (ref 2.5–4.5)
PLATELET # BLD AUTO: 297 K/UL (ref 164–446)
PMV BLD AUTO: 9.9 FL (ref 9–12.9)
POTASSIUM SERPL-SCNC: 4.7 MMOL/L (ref 3.6–5.5)
PROT SERPL-MCNC: 7.1 G/DL (ref 6–8.2)
RBC # BLD AUTO: 4.31 M/UL (ref 4.7–6.1)
SODIUM SERPL-SCNC: 135 MMOL/L (ref 135–145)
WBC # BLD AUTO: 8.8 K/UL (ref 4.8–10.8)

## 2022-04-29 PROCEDURE — 700105 HCHG RX REV CODE 258: Performed by: STUDENT IN AN ORGANIZED HEALTH CARE EDUCATION/TRAINING PROGRAM

## 2022-04-29 PROCEDURE — 84100 ASSAY OF PHOSPHORUS: CPT

## 2022-04-29 PROCEDURE — 700111 HCHG RX REV CODE 636 W/ 250 OVERRIDE (IP): Performed by: STUDENT IN AN ORGANIZED HEALTH CARE EDUCATION/TRAINING PROGRAM

## 2022-04-29 PROCEDURE — 85025 COMPLETE CBC W/AUTO DIFF WBC: CPT

## 2022-04-29 PROCEDURE — 82962 GLUCOSE BLOOD TEST: CPT

## 2022-04-29 PROCEDURE — HZ2ZZZZ DETOXIFICATION SERVICES FOR SUBSTANCE ABUSE TREATMENT: ICD-10-PCS | Performed by: STUDENT IN AN ORGANIZED HEALTH CARE EDUCATION/TRAINING PROGRAM

## 2022-04-29 PROCEDURE — 700101 HCHG RX REV CODE 250: Performed by: STUDENT IN AN ORGANIZED HEALTH CARE EDUCATION/TRAINING PROGRAM

## 2022-04-29 PROCEDURE — 80053 COMPREHEN METABOLIC PANEL: CPT

## 2022-04-29 PROCEDURE — 83735 ASSAY OF MAGNESIUM: CPT

## 2022-04-29 PROCEDURE — 36415 COLL VENOUS BLD VENIPUNCTURE: CPT

## 2022-04-29 PROCEDURE — 770001 HCHG ROOM/CARE - MED/SURG/GYN PRIV*

## 2022-04-29 PROCEDURE — 99285 EMERGENCY DEPT VISIT HI MDM: CPT

## 2022-04-29 PROCEDURE — 99223 1ST HOSP IP/OBS HIGH 75: CPT | Mod: AI | Performed by: STUDENT IN AN ORGANIZED HEALTH CARE EDUCATION/TRAINING PROGRAM

## 2022-04-29 RX ORDER — LORAZEPAM 2 MG/1
2 TABLET ORAL
Status: DISCONTINUED | OUTPATIENT
Start: 2022-04-29 | End: 2022-05-01 | Stop reason: HOSPADM

## 2022-04-29 RX ORDER — LORAZEPAM 2 MG/1
4 TABLET ORAL
Status: DISCONTINUED | OUTPATIENT
Start: 2022-04-29 | End: 2022-05-01 | Stop reason: HOSPADM

## 2022-04-29 RX ORDER — LORAZEPAM 1 MG/1
1 TABLET ORAL EVERY 4 HOURS PRN
Status: DISCONTINUED | OUTPATIENT
Start: 2022-04-29 | End: 2022-05-01 | Stop reason: HOSPADM

## 2022-04-29 RX ORDER — SODIUM CHLORIDE 9 MG/ML
1000 INJECTION, SOLUTION INTRAVENOUS ONCE
Status: COMPLETED | OUTPATIENT
Start: 2022-04-29 | End: 2022-04-29

## 2022-04-29 RX ORDER — FOLIC ACID 1 MG/1
1 TABLET ORAL DAILY
Status: DISCONTINUED | OUTPATIENT
Start: 2022-04-30 | End: 2022-05-01 | Stop reason: HOSPADM

## 2022-04-29 RX ORDER — ENOXAPARIN SODIUM 100 MG/ML
40 INJECTION SUBCUTANEOUS DAILY
Status: DISCONTINUED | OUTPATIENT
Start: 2022-04-30 | End: 2022-05-01 | Stop reason: HOSPADM

## 2022-04-29 RX ORDER — LORAZEPAM 2 MG/ML
0.5 INJECTION INTRAMUSCULAR EVERY 4 HOURS PRN
Status: DISCONTINUED | OUTPATIENT
Start: 2022-04-29 | End: 2022-05-01 | Stop reason: HOSPADM

## 2022-04-29 RX ORDER — LORAZEPAM 2 MG/ML
1.5 INJECTION INTRAMUSCULAR
Status: DISCONTINUED | OUTPATIENT
Start: 2022-04-29 | End: 2022-05-01 | Stop reason: HOSPADM

## 2022-04-29 RX ORDER — CHOLECALCIFEROL (VITAMIN D3) 125 MCG
1000 CAPSULE ORAL DAILY
Status: DISCONTINUED | OUTPATIENT
Start: 2022-04-29 | End: 2022-05-01 | Stop reason: HOSPADM

## 2022-04-29 RX ORDER — LORAZEPAM 0.5 MG/1
0.5 TABLET ORAL EVERY 4 HOURS PRN
Status: DISCONTINUED | OUTPATIENT
Start: 2022-04-29 | End: 2022-05-01 | Stop reason: HOSPADM

## 2022-04-29 RX ORDER — LORAZEPAM 2 MG/ML
2 INJECTION INTRAMUSCULAR
Status: DISCONTINUED | OUTPATIENT
Start: 2022-04-29 | End: 2022-05-01 | Stop reason: HOSPADM

## 2022-04-29 RX ORDER — GAUZE BANDAGE 2" X 2"
100 BANDAGE TOPICAL DAILY
Status: DISCONTINUED | OUTPATIENT
Start: 2022-04-30 | End: 2022-05-01 | Stop reason: HOSPADM

## 2022-04-29 RX ORDER — LORAZEPAM 2 MG/ML
1 INJECTION INTRAMUSCULAR
Status: DISCONTINUED | OUTPATIENT
Start: 2022-04-29 | End: 2022-05-01 | Stop reason: HOSPADM

## 2022-04-29 RX ADMIN — THIAMINE HYDROCHLORIDE: 100 INJECTION, SOLUTION INTRAMUSCULAR; INTRAVENOUS at 23:02

## 2022-04-29 RX ADMIN — SODIUM CHLORIDE 1000 ML: 9 INJECTION, SOLUTION INTRAVENOUS at 21:30

## 2022-04-29 RX ADMIN — LORAZEPAM 2 MG: 2 INJECTION INTRAMUSCULAR; INTRAVENOUS at 23:23

## 2022-04-29 RX ADMIN — LORAZEPAM 2 MG: 2 INJECTION INTRAMUSCULAR; INTRAVENOUS at 23:11

## 2022-04-29 ASSESSMENT — LIFESTYLE VARIABLES
ANXIETY: *
PAROXYSMAL SWEATS: NO SWEAT VISIBLE
TOTAL SCORE: 30
HEADACHE, FULLNESS IN HEAD: NOT PRESENT
VISUAL DISTURBANCES: MODERATELY SEVERE HALLUCINATIONS
ANXIETY: *
AGITATION: *
TREMOR: NO TREMOR
VISUAL DISTURBANCES: MODERATELY SEVERE HALLUCINATIONS
TREMOR: NO TREMOR
HEADACHE, FULLNESS IN HEAD: NOT PRESENT
TOTAL SCORE: MODERATELY SEVERE HALLUCINATIONS
NAUSEA AND VOMITING: NO NAUSEA AND NO VOMITING
ORIENTATION AND CLOUDING OF SENSORIUM: DISORIENTED FOR PLACE AND / OR PERSON
ANXIETY: *
TREMOR: NO TREMOR
AUDITORY DISTURBANCES: MODERATELY SEVERE HALLUCINATIONS
AUDITORY DISTURBANCES: NOT PRESENT
PAROXYSMAL SWEATS: BARELY PERCEPTIBLE SWEATING. PALMS MOIST
SUBSTANCE_ABUSE: 1
TOTAL SCORE: 26
VISUAL DISTURBANCES: NOT PRESENT
AUDITORY DISTURBANCES: MODERATELY SEVERE HALLUCINATIONS
HEADACHE, FULLNESS IN HEAD: MODERATE
PAROXYSMAL SWEATS: NO SWEAT VISIBLE
NAUSEA AND VOMITING: NO NAUSEA AND NO VOMITING
TOTAL SCORE: MODERATELY SEVERE HALLUCINATIONS
TOTAL SCORE: 9
ORIENTATION AND CLOUDING OF SENSORIUM: DISORIENTED FOR PLACE AND / OR PERSON
NAUSEA AND VOMITING: NO NAUSEA AND NO VOMITING
ORIENTATION AND CLOUDING OF SENSORIUM: DISORIENTED FOR PLACE AND / OR PERSON
AGITATION: *
AGITATION: *

## 2022-04-29 ASSESSMENT — ENCOUNTER SYMPTOMS
LOSS OF CONSCIOUSNESS: 0
HALLUCINATIONS: 1
ABDOMINAL PAIN: 0
FALLS: 0
VOMITING: 0
HEADACHES: 0
SORE THROAT: 0
CHILLS: 0
NERVOUS/ANXIOUS: 1
FEVER: 0
SHORTNESS OF BREATH: 0
DOUBLE VISION: 0
BLURRED VISION: 0
NECK PAIN: 0
NAUSEA: 0
COUGH: 0

## 2022-04-29 ASSESSMENT — FIBROSIS 4 INDEX: FIB4 SCORE: 1.49

## 2022-04-30 PROBLEM — E87.20 METABOLIC ACIDOSIS: Status: ACTIVE | Noted: 2022-04-30

## 2022-04-30 PROCEDURE — 770001 HCHG ROOM/CARE - MED/SURG/GYN PRIV*

## 2022-04-30 PROCEDURE — 99232 SBSQ HOSP IP/OBS MODERATE 35: CPT | Performed by: STUDENT IN AN ORGANIZED HEALTH CARE EDUCATION/TRAINING PROGRAM

## 2022-04-30 PROCEDURE — A9270 NON-COVERED ITEM OR SERVICE: HCPCS | Performed by: STUDENT IN AN ORGANIZED HEALTH CARE EDUCATION/TRAINING PROGRAM

## 2022-04-30 PROCEDURE — 700102 HCHG RX REV CODE 250 W/ 637 OVERRIDE(OP): Performed by: STUDENT IN AN ORGANIZED HEALTH CARE EDUCATION/TRAINING PROGRAM

## 2022-04-30 PROCEDURE — 700111 HCHG RX REV CODE 636 W/ 250 OVERRIDE (IP): Performed by: STUDENT IN AN ORGANIZED HEALTH CARE EDUCATION/TRAINING PROGRAM

## 2022-04-30 RX ORDER — UREA 10 %
3 LOTION (ML) TOPICAL
Status: DISCONTINUED | OUTPATIENT
Start: 2022-04-30 | End: 2022-05-01 | Stop reason: HOSPADM

## 2022-04-30 RX ORDER — MELOXICAM 7.5 MG/1
15 TABLET ORAL DAILY
Status: DISCONTINUED | OUTPATIENT
Start: 2022-05-01 | End: 2022-05-01 | Stop reason: HOSPADM

## 2022-04-30 RX ORDER — TRAZODONE HYDROCHLORIDE 50 MG/1
50 TABLET ORAL
Status: DISCONTINUED | OUTPATIENT
Start: 2022-04-30 | End: 2022-04-30

## 2022-04-30 RX ORDER — TRAZODONE HYDROCHLORIDE 50 MG/1
50 TABLET ORAL NIGHTLY
Status: DISCONTINUED | OUTPATIENT
Start: 2022-04-30 | End: 2022-04-30

## 2022-04-30 RX ORDER — CHOLECALCIFEROL (VITAMIN D3) 125 MCG
5 CAPSULE ORAL NIGHTLY
Status: DISCONTINUED | OUTPATIENT
Start: 2022-04-30 | End: 2022-04-30

## 2022-04-30 RX ADMIN — LORAZEPAM 1 MG: 1 TABLET ORAL at 17:49

## 2022-04-30 RX ADMIN — ENOXAPARIN SODIUM 40 MG: 40 INJECTION SUBCUTANEOUS at 05:38

## 2022-04-30 RX ADMIN — Medication 3 MG: at 23:19

## 2022-04-30 RX ADMIN — LORAZEPAM 1 MG: 1 TABLET ORAL at 23:23

## 2022-04-30 ASSESSMENT — LIFESTYLE VARIABLES
AUDITORY DISTURBANCES: NOT PRESENT
AUDITORY DISTURBANCES: NOT PRESENT
AGITATION: NORMAL ACTIVITY
NAUSEA AND VOMITING: NO NAUSEA AND NO VOMITING
AUDITORY DISTURBANCES: NOT PRESENT
PAROXYSMAL SWEATS: NO SWEAT VISIBLE
VISUAL DISTURBANCES: NOT PRESENT
ORIENTATION AND CLOUDING OF SENSORIUM: DISORIENTED FOR PLACE AND / OR PERSON
HEADACHE, FULLNESS IN HEAD: VERY MILD
NAUSEA AND VOMITING: NO NAUSEA AND NO VOMITING
PAROXYSMAL SWEATS: NO SWEAT VISIBLE
TOTAL SCORE: 5
EVER FELT BAD OR GUILTY ABOUT YOUR DRINKING: YES
VISUAL DISTURBANCES: NOT PRESENT
HEADACHE, FULLNESS IN HEAD: NOT PRESENT
TREMOR: MODERATE TREMOR WITH ARMS EXTENDED
ANXIETY: NO ANXIETY (AT EASE)
VISUAL DISTURBANCES: NOT PRESENT
ANXIETY: NO ANXIETY (AT EASE)
TREMOR: NO TREMOR
AGITATION: NORMAL ACTIVITY
EVER HAD A DRINK FIRST THING IN THE MORNING TO STEADY YOUR NERVES TO GET RID OF A HANGOVER: YES
HEADACHE, FULLNESS IN HEAD: NOT PRESENT
ORIENTATION AND CLOUDING OF SENSORIUM: DISORIENTED FOR PLACE AND / OR PERSON
PAROXYSMAL SWEATS: NO SWEAT VISIBLE
ALCOHOL_USE: YES
ORIENTATION AND CLOUDING OF SENSORIUM: DISORIENTED FOR PLACE AND / OR PERSON
TOTAL SCORE: 4
TOTAL SCORE: 4
ORIENTATION AND CLOUDING OF SENSORIUM: CANNOT DO SERIAL ADDITIONS OR IS UNCERTAIN ABOUT DATE
AGITATION: NORMAL ACTIVITY
HAVE YOU EVER FELT YOU SHOULD CUT DOWN ON YOUR DRINKING: YES
EVER_SMOKED: YES
AGITATION: NORMAL ACTIVITY
HAVE PEOPLE ANNOYED YOU BY CRITICIZING YOUR DRINKING: YES
NAUSEA AND VOMITING: NO NAUSEA AND NO VOMITING
VISUAL DISTURBANCES: NOT PRESENT
ANXIETY: NO ANXIETY (AT EASE)
AUDITORY DISTURBANCES: NOT PRESENT
TREMOR: TREMOR NOT VISIBLE BUT CAN BE FELT, FINGERTIP TO FINGERTIP
NAUSEA AND VOMITING: NO NAUSEA AND NO VOMITING
NAUSEA AND VOMITING: NO NAUSEA AND NO VOMITING
AUDITORY DISTURBANCES: NOT PRESENT
TREMOR: NO TREMOR
CONSUMPTION TOTAL: POSITIVE
AUDITORY DISTURBANCES: NOT PRESENT
AUDITORY DISTURBANCES: VERY MILD HARSHNESS OR ABILITY TO FRIGHTEN
ANXIETY: NO ANXIETY (AT EASE)
ORIENTATION AND CLOUDING OF SENSORIUM: DISORIENTED FOR PLACE AND / OR PERSON
AGITATION: NORMAL ACTIVITY
HOW MANY TIMES IN THE PAST YEAR HAVE YOU HAD 5 OR MORE DRINKS IN A DAY: 200
AUDITORY DISTURBANCES: NOT PRESENT
PAROXYSMAL SWEATS: BARELY PERCEPTIBLE SWEATING. PALMS MOIST
TREMOR: NO TREMOR
AVERAGE NUMBER OF DAYS PER WEEK YOU HAVE A DRINK CONTAINING ALCOHOL: 7
VISUAL DISTURBANCES: NOT PRESENT
TREMOR: *
NAUSEA AND VOMITING: NO NAUSEA AND NO VOMITING
TOTAL SCORE: 4
AGITATION: NORMAL ACTIVITY
ORIENTATION AND CLOUDING OF SENSORIUM: DISORIENTED FOR PLACE AND / OR PERSON
VISUAL DISTURBANCES: NOT PRESENT
TOTAL SCORE: 4
HEADACHE, FULLNESS IN HEAD: NOT PRESENT
DOES PATIENT WANT TO STOP DRINKING: YES
ORIENTATION AND CLOUDING OF SENSORIUM: CANNOT DO SERIAL ADDITIONS OR IS UNCERTAIN ABOUT DATE
PAROXYSMAL SWEATS: BARELY PERCEPTIBLE SWEATING. PALMS MOIST
ON A TYPICAL DAY WHEN YOU DRINK ALCOHOL HOW MANY DRINKS DO YOU HAVE: 10
HEADACHE, FULLNESS IN HEAD: NOT PRESENT
VISUAL DISTURBANCES: NOT PRESENT
ORIENTATION AND CLOUDING OF SENSORIUM: DISORIENTED FOR PLACE AND / OR PERSON
HEADACHE, FULLNESS IN HEAD: NOT PRESENT
PAROXYSMAL SWEATS: NO SWEAT VISIBLE
HEADACHE, FULLNESS IN HEAD: NOT PRESENT
ANXIETY: NO ANXIETY (AT EASE)
TOTAL SCORE: 4
TOTAL SCORE: 8
VISUAL DISTURBANCES: NOT PRESENT
AGITATION: NORMAL ACTIVITY
AGITATION: SOMEWHAT MORE THAN NORMAL ACTIVITY
PAROXYSMAL SWEATS: NO SWEAT VISIBLE
ANXIETY: NO ANXIETY (AT EASE)
TREMOR: NO TREMOR
TOTAL SCORE: 4
TOTAL SCORE: 3
TOTAL SCORE: 10
ANXIETY: MILDLY ANXIOUS
NAUSEA AND VOMITING: MILD NAUSEA WITH NO VOMITING
PAROXYSMAL SWEATS: NO SWEAT VISIBLE
TOTAL SCORE: 4
NAUSEA AND VOMITING: NO NAUSEA AND NO VOMITING
HEADACHE, FULLNESS IN HEAD: VERY MILD
TREMOR: NO TREMOR
ANXIETY: NO ANXIETY (AT EASE)

## 2022-04-30 ASSESSMENT — COGNITIVE AND FUNCTIONAL STATUS - GENERAL
MOBILITY SCORE: 21
CLIMB 3 TO 5 STEPS WITH RAILING: A LITTLE
WALKING IN HOSPITAL ROOM: A LITTLE
DRESSING REGULAR UPPER BODY CLOTHING: A LITTLE
SUGGESTED CMS G CODE MODIFIER DAILY ACTIVITY: CK
PERSONAL GROOMING: A LITTLE
STANDING UP FROM CHAIR USING ARMS: A LITTLE
EATING MEALS: A LITTLE
SUGGESTED CMS G CODE MODIFIER MOBILITY: CJ
DRESSING REGULAR LOWER BODY CLOTHING: A LITTLE
DAILY ACTIVITIY SCORE: 18
HELP NEEDED FOR BATHING: A LITTLE
TOILETING: A LITTLE

## 2022-04-30 ASSESSMENT — PATIENT HEALTH QUESTIONNAIRE - PHQ9
6. FEELING BAD ABOUT YOURSELF - OR THAT YOU ARE A FAILURE OR HAVE LET YOURSELF OR YOUR FAMILY DOWN: MORE THAN HALF THE DAYS
9. THOUGHTS THAT YOU WOULD BE BETTER OFF DEAD, OR OF HURTING YOURSELF: NOT AT ALL
3. TROUBLE FALLING OR STAYING ASLEEP OR SLEEPING TOO MUCH: NOT AT ALL
2. FEELING DOWN, DEPRESSED, IRRITABLE, OR HOPELESS: NEARLY EVERY DAY
SUM OF ALL RESPONSES TO PHQ QUESTIONS 1-9: 6
7. TROUBLE CONCENTRATING ON THINGS, SUCH AS READING THE NEWSPAPER OR WATCHING TELEVISION: NOT AT ALL
8. MOVING OR SPEAKING SO SLOWLY THAT OTHER PEOPLE COULD HAVE NOTICED. OR THE OPPOSITE, BEING SO FIGETY OR RESTLESS THAT YOU HAVE BEEN MOVING AROUND A LOT MORE THAN USUAL: NOT AT ALL
1. LITTLE INTEREST OR PLEASURE IN DOING THINGS: SEVERAL DAYS
4. FEELING TIRED OR HAVING LITTLE ENERGY: NOT AT ALL
5. POOR APPETITE OR OVEREATING: NOT AT ALL
SUM OF ALL RESPONSES TO PHQ9 QUESTIONS 1 AND 2: 4

## 2022-04-30 ASSESSMENT — PAIN DESCRIPTION - PAIN TYPE
TYPE: ACUTE PAIN
TYPE: ACUTE PAIN

## 2022-04-30 ASSESSMENT — FIBROSIS 4 INDEX
FIB4 SCORE: 1.31
FIB4 SCORE: 1.31

## 2022-04-30 ASSESSMENT — COPD QUESTIONNAIRES
COPD SCREENING SCORE: 4
DO YOU EVER COUGH UP ANY MUCUS OR PHLEGM?: NO/ONLY WITH OCCASIONAL COLDS OR INFECTIONS
DURING THE PAST 4 WEEKS HOW MUCH DID YOU FEEL SHORT OF BREATH: NONE/LITTLE OF THE TIME
HAVE YOU SMOKED AT LEAST 100 CIGARETTES IN YOUR ENTIRE LIFE: YES

## 2022-04-30 NOTE — H&P
Hospital Medicine History & Physical Note    Date of Service  4/29/2022    Primary Care Physician  Pcp Pt States None    Consultants  none     Specialist Names: none     Code Status  Full Code    Chief Complaint  Chief Complaint   Patient presents with   • Alcohol Intoxication     BIB EMS. Requesting detox from alcohol. This is his 5th visit here this month.   BS: 93.   • Detox       History of Presenting Illness  Mohsen Adler is a 71 y.o. male past medical history of alcohol abuse who presented 4/29/2022 with hallucinations that started today.  History is obtained entirely from chart review as patient is sleeping and when aroused is aggressive and does not participate in any interview.  He reports his last drink was earlier this afternoon as he started to have withdrawal symptoms. He     ER physician requesting hospital admission for alcohol withdrawal with delirium.    I discussed the plan of care with patient.    Review of Systems  Review of Systems   Unable to perform ROS: Mental acuity   Psychiatric/Behavioral: Positive for hallucinations and substance abuse. The patient is nervous/anxious.        Past Medical History   has a past medical history of Alcohol abuse and Chronic obstructive pulmonary disease (HCC).    Surgical History  Reviewed and not pertinent      Family History  Reviewed and not pertinent     Family history reviewed with patient. There is no family history that is pertinent to the chief complaint.     Social History   reports that he has been smoking cigarettes. He has been smoking about 0.50 packs per day. He has never used smokeless tobacco. He reports current alcohol use. He reports current drug use.    Allergies  Allergies   Allergen Reactions   • Benadryl Allergy Unspecified     Pt states that it exacerbates his restless leg syndrome.    • Seroquel [Quetiapine] Unspecified     Pt states that it exacerbates his restless leg syndrome.        Medications  Prior to Admission Medications    Prescriptions Last Dose Informant Patient Reported? Taking?   melatonin 3 MG Tab UNK at UNK Historical Yes No   Sig: Take 3 mg by mouth at bedtime.   meloxicam (MOBIC) 15 MG tablet UNK at UNK Historical Yes No   Sig: Take 15 mg by mouth every day.   traZODone (DESYREL) 50 MG Tab UNK at UNK Historical Yes No   Sig: Take 50 mg by mouth every evening.      Facility-Administered Medications: None       Physical Exam  Temp:  [36.7 °C (98.1 °F)-37.4 °C (99.3 °F)] 36.7 °C (98.1 °F)  Pulse:  [71-86] 86  Resp:  [16-18] 16  BP: ()/(54-81) 113/57  SpO2:  [82 %-94 %] 94 %  Blood Pressure : 113/57   Temperature: 36.7 °C (98.1 °F)   Pulse: 86   Respiration: 16   Pulse Oximetry: 94 %       Physical Exam  Vitals and nursing note reviewed.   Constitutional:       Comments: -Sleeping with loud snores.  -Attempts to wake him up gets him angry and aggressive    HENT:      Head: Normocephalic and atraumatic.      Right Ear: Tympanic membrane normal.      Left Ear: Tympanic membrane normal.      Nose: Nose normal. No congestion or rhinorrhea.      Mouth/Throat:      Mouth: Mucous membranes are dry.      Pharynx: Oropharynx is clear.   Eyes:      Extraocular Movements: Extraocular movements intact.      Pupils: Pupils are equal, round, and reactive to light.   Cardiovascular:      Rate and Rhythm: Normal rate and regular rhythm.      Pulses: Normal pulses.      Heart sounds: Normal heart sounds. No murmur heard.    No friction rub.   Pulmonary:      Effort: Pulmonary effort is normal. No respiratory distress.      Breath sounds: Normal breath sounds. No stridor. No wheezing or rhonchi.   Abdominal:      General: Bowel sounds are normal. There is no distension.      Palpations: Abdomen is soft. There is no mass.      Tenderness: There is no abdominal tenderness.      Hernia: No hernia is present.   Musculoskeletal:         General: No swelling, tenderness, deformity or signs of injury. Normal range of motion.      Cervical back:  Neck supple.      Comments: bilateral tremors on outstretched hands    Skin:     General: Skin is warm.      Capillary Refill: Capillary refill takes less than 2 seconds.      Coloration: Skin is not jaundiced or pale.      Findings: No bruising or erythema.   Neurological:      Mental Status: He is disoriented.      Cranial Nerves: No cranial nerve deficit.      Sensory: No sensory deficit.      Comments: Intoxicated    Psychiatric:      Comments: Hallucinating          Laboratory:  Recent Labs     04/29/22 2130   WBC 8.8   RBC 4.31*   HEMOGLOBIN 13.3*   HEMATOCRIT 39.9*   MCV 92.6   MCH 30.9   MCHC 33.3*   RDW 49.3   PLATELETCT 297   MPV 9.9     Recent Labs     04/29/22 2130   SODIUM 135   POTASSIUM 4.7   CHLORIDE 101   CO2 16*   GLUCOSE 71   BUN 19   CREATININE 0.92   CALCIUM 8.9     Recent Labs     04/29/22 2130   ALTSGPT 23   ASTSGOT 27   ALKPHOSPHAT 128*   TBILIRUBIN 0.4   GLUCOSE 71       Imaging:  No orders to display       no X-Ray or EKG requiring interpretation    Assessment/Plan:  Justification for Admission Status  I anticipate this patient will require at least two midnights for appropriate medical management, necessitating inpatient admission because alcohol withdrawal with delirium     * Alcohol withdrawal with delirium (HCC)  Assessment & Plan  [facility] protocol  IV hydration detox bag   Thiamine and folic acid   Neuro-checks every 4 hours  NPO for now once mental status improves may advance diet.  Fall, seizure, aspiration precautions       B12 deficiency  Assessment & Plan  Replete       VTE prophylaxis: enoxaparin ppx

## 2022-04-30 NOTE — PROGRESS NOTES
4 Eyes Skin Assessment Completed by WALTER Gorman and WALTER Cardenas.    Head WDL  Ears WDL  Nose WDL  Mouth WDL  Neck WDL  Breast/Chest WDL  Shoulder Blades WDL  Spine WDL  (R) Arm/Elbow/Hand WDL  (L) Arm/Elbow/Hand WDL  Abdomen WDL  Groin WDL  Scrotum/Coccyx/Buttocks Redness/blanching  (R) Leg WDL  (L) Leg WDL  (R) Heel/Foot/Toe Redness Blanching  (L) Heel/Foot/Toe WDL          Devices In Places Oxy Mask      Interventions In Place Pillows    Possible Skin Injury No    Pictures Uploaded Into Epic N/A  Wound Consult Placed N/A  RN Wound Prevention Protocol Ordered No

## 2022-04-30 NOTE — ED NOTES
Pt transported to the floor via gurney by [place]. Pt belongings and paper work sent with patient. Pt is refusing to answer a&o questions.

## 2022-04-30 NOTE — HOSPITAL COURSE
70 yo male with history of alcohol abuse and history of alcohol withdrawal who presents to the hospital with symptoms of alcohol withdrawal symptoms and desire to detox, this is the patients 5 visit for the same this month.   On arrival pt was hypotensive with BP 82/54 otherwise stable vitals, labs with chronic anemia.   Patient admitted for alcohol withdrawal

## 2022-04-30 NOTE — CARE PLAN
Problem: Knowledge Deficit - Standard  Goal: Patient and family/care givers will demonstrate understanding of plan of care, disease process/condition, diagnostic tests and medications  Outcome: Not Progressing     Problem: Optimal Care for Alcohol Withdrawal  Goal: Optimal Care for the alcohol withdrawal patient  Outcome: Not Progressing     Problem: Seizure Precautions  Goal: Implementation of seizure precautions  Outcome: Not Progressing     Problem: Lifestyle Changes  Goal: Patient's ability to identify lifestyle changes and available resources to help reduce recurrence of condition will improve  Outcome: Not Progressing     Problem: Psychosocial  Goal: Patient's level of anxiety will decrease  Outcome: Not Progressing  Goal: Spiritual and cultural needs incorporated into hospitalization  Outcome: Not Progressing     Problem: Risk for Aspiration  Goal: Patient's risk for aspiration will be absent or decrease  Outcome: Not Progressing     Problem: Pain - Standard  Goal: Alleviation of pain or a reduction in pain to the patient’s comfort goal  Outcome: Not Progressing     Problem: Skin Integrity  Goal: Skin integrity is maintained or improved  Outcome: Not Progressing     Problem: Fall Risk  Goal: Patient will remain free from falls  Outcome: Not Progressing   The patient is Watcher - Medium risk of patient condition declining or worsening    Shift Goals  Patient Goals: unable to verbalize    Progress made toward(s) clinical / shift goals:  vitals are stable    Patient is not progressing towards the following goals:pt just admitted      Problem: Knowledge Deficit - Standard  Goal: Patient and family/care givers will demonstrate understanding of plan of care, disease process/condition, diagnostic tests and medications  Outcome: Not Progressing     Problem: Optimal Care for Alcohol Withdrawal  Goal: Optimal Care for the alcohol withdrawal patient  Outcome: Not Progressing     Problem: Seizure Precautions  Goal:  Implementation of seizure precautions  Outcome: Not Progressing     Problem: Lifestyle Changes  Goal: Patient's ability to identify lifestyle changes and available resources to help reduce recurrence of condition will improve  Outcome: Not Progressing     Problem: Psychosocial  Goal: Patient's level of anxiety will decrease  Outcome: Not Progressing  Goal: Spiritual and cultural needs incorporated into hospitalization  Outcome: Not Progressing     Problem: Risk for Aspiration  Goal: Patient's risk for aspiration will be absent or decrease  Outcome: Not Progressing     Problem: Pain - Standard  Goal: Alleviation of pain or a reduction in pain to the patient’s comfort goal  Outcome: Not Progressing     Problem: Skin Integrity  Goal: Skin integrity is maintained or improved  Outcome: Not Progressing     Problem: Fall Risk  Goal: Patient will remain free from falls  Outcome: Not Progressing

## 2022-04-30 NOTE — CARE PLAN
Problem: Pain - Standard  Goal: Alleviation of pain or a reduction in pain to the patient’s comfort goal  Outcome: Progressing  Note: Pain accessed qshift and prn, encouraged to notify if having pain     Problem: Skin Integrity  Goal: Skin integrity is maintained or improved  Outcome: Progressing  Note: Skin checked qshift and prn     Problem: Fall Risk  Goal: Patient will remain free from falls  Outcome: Progressing  Note: Patient nonslip socks on, call light in reach, safety rails up x 2, bed alarm on as needed

## 2022-04-30 NOTE — PROGRESS NOTES
Hospital Medicine Daily Progress Note    Date of Service  4/30/2022    Chief Complaint  Mohsen Adler is a 71 y.o. male admitted 4/29/2022 with alcohol intoxication and withdrawal     Hospital Course  72 yo male with history of alcohol abuse and history of alcohol withdrawal who presents to the hospital with symptoms of alcohol withdrawal symptoms and desire to detox, this is the patients 5 visit for the same this month.   On arrival pt was hypotensive with BP 82/54 otherwise stable vitals, labs with chronic anemia.   Patient admitted for alcohol withdrawal       Interval Problem Update  Patient seen at bedside, he is sleeping comfortably in no distress, does not want to participate in exam, awoke with nursing and ate and snack without issue   - vitals and labs are stable   - continue CIWA protocol and supportive care  - reg diet as tolerated     I have personally seen and examined the patient at bedside. I discussed the plan of care with patient and bedside RN.    Consultants/Specialty  NA    Code Status  Full Code    Disposition  Patient is not medically cleared for discharge.   Anticipate discharge to to home with close outpatient follow-up.  I have placed the appropriate orders for post-discharge needs.    Review of Systems  Review of Systems   Unable to perform ROS: Mental acuity        Physical Exam  Temp:  [36.1 °C (97 °F)-37.4 °C (99.3 °F)] 37.1 °C (98.7 °F)  Pulse:  [] 79  Resp:  [16-24] 18  BP: ()/(52-81) 130/61  SpO2:  [82 %-97 %] 97 %    Physical Exam  Vitals reviewed.   Constitutional:       Appearance: He is ill-appearing. He is not toxic-appearing.   HENT:      Head: Normocephalic and atraumatic.      Mouth/Throat:      Mouth: Mucous membranes are dry.   Eyes:      Conjunctiva/sclera: Conjunctivae normal.   Cardiovascular:      Rate and Rhythm: Normal rate.      Heart sounds: Normal heart sounds.   Pulmonary:      Breath sounds: Normal breath sounds.   Abdominal:      General: Bowel  sounds are normal.      Palpations: Abdomen is soft.   Musculoskeletal:         General: Normal range of motion.   Skin:     General: Skin is warm.   Neurological:      General: No focal deficit present.   Psychiatric:      Comments: Agitated mood          Fluids    Intake/Output Summary (Last 24 hours) at 4/30/2022 1144  Last data filed at 4/30/2022 1030  Gross per 24 hour   Intake 241.67 ml   Output 1575 ml   Net -1333.33 ml       Laboratory  Recent Labs     04/29/22  2130   WBC 8.8   RBC 4.31*   HEMOGLOBIN 13.3*   HEMATOCRIT 39.9*   MCV 92.6   MCH 30.9   MCHC 33.3*   RDW 49.3   PLATELETCT 297   MPV 9.9     Recent Labs     04/29/22  2130   SODIUM 135   POTASSIUM 4.7   CHLORIDE 101   CO2 16*   GLUCOSE 71   BUN 19   CREATININE 0.92   CALCIUM 8.9                   Imaging  No orders to display        Assessment/Plan  * Alcohol withdrawal with delirium (HCC)  Assessment & Plan  Alcohol abuse, hx of alcohol withdrawal,here for withdrawal   Continue CIWA protocol  IV hydration detox bag   Thiamine and folic acid   Neuro-checks every 4 hours  Diet as tolerated   Fall, seizure, aspiration precautions       Metabolic acidosis- (present on admission)  Assessment & Plan  Bicarb 16, likely due to alcohol/starvation ketosis   Monitor   Encourage oral intake     B12 deficiency  Assessment & Plan  Replete        VTE prophylaxis: enoxaparin ppx    I have performed a physical exam and reviewed and updated ROS and Plan today (4/30/2022). In review of yesterday's note (4/29/2022), there are no changes except as documented above.

## 2022-04-30 NOTE — ED NOTES
Pt brought from lobby to [facility] 73 via wheelchair. Pt is now sitting up in bed with even and unlabored breaths, in no apparent distress at this time. Will continue to monitor pt while awaiting orders.

## 2022-04-30 NOTE — PROGRESS NOTES
Pt refuses to answer any questions at this time. Unable to do admission due to this. Pt is hallucinating and becomes angry and combative when touched. Unable to do vs due to this. ciwa scale completed. Unable to do 4 eyes skin check due to pt's behavior.

## 2022-04-30 NOTE — ASSESSMENT & PLAN NOTE
Alcohol abuse, hx of alcohol withdrawal,here for withdrawal   Continue CIWA protocol  IV hydration detox bag   Thiamine and folic acid   Neuro-checks every 4 hours  Diet as tolerated   Fall, seizure, aspiration precautions

## 2022-04-30 NOTE — DISCHARGE PLANNING
Anticipated Discharge Disposition: Discharge to home.    Action: Pt. Has been admitted five times this month. He is requesting Alcohol withdrawal rehab. Resources to be given by . [facility] Uncertain if pt. Will follow thru with outpatient resources for alcohol dependency.    Barriers to Discharge: medical clearance    Plan: [facility] will continue to assist with discharge planning and barriers.

## 2022-04-30 NOTE — ED PROVIDER NOTES
"ED Provider Note    Chief Complaint:   Alcohol withdrawal    HPI:  Mohsen Adler is a very pleasant 71-year-old male who presents with reports of hallucinations today.  Patient reports history of DTs and history of alcohol withdrawal seizures.  Patient reports that he is seeing other humans who are whispering that other people do not see.  Patient denies recent falls, nausea or vomiting or any medical complaint otherwise.  Patient endorses anxiety and tremors.  Patient reports he drank some alcohol earlier this afternoon due to the symptoms.    Review of Systems:  Review of Systems   Constitutional: Negative for chills and fever.   HENT: Negative for congestion and sore throat.    Eyes: Negative for blurred vision and double vision.   Respiratory: Negative for cough and shortness of breath.    Cardiovascular: Negative for chest pain and leg swelling.   Gastrointestinal: Negative for abdominal pain, nausea and vomiting.   Genitourinary: Negative for dysuria and hematuria.   Musculoskeletal: Negative for falls and neck pain.   Skin: Negative for itching and rash.   Neurological: Negative for loss of consciousness and headaches.   Psychiatric/Behavioral: Positive for hallucinations.       Past Medical History:   has a past medical history of Alcohol abuse and Chronic obstructive pulmonary disease (HCC).    Social History:  Social History     Tobacco Use   • Smoking status: Current Every Day Smoker     Packs/day: 0.50     Types: Cigarettes   • Smokeless tobacco: Never Used   Vaping Use   • Vaping Use: Never used   Substance and Sexual Activity   • Alcohol use: Yes     Comment: 1 pint of vodka per day, \"whenever I get a chance\"   • Drug use: Yes   • Sexual activity: Not on file       Surgical History:  patient denies any surgical history    Allergies:  Allergies   Allergen Reactions   • Benadryl Allergy Unspecified     Pt states that it exacerbates his restless leg syndrome.    • Seroquel [Quetiapine] Unspecified     Pt " "states that it exacerbates his restless leg syndrome.        Physical Exam:  Vital Signs: /58   Pulse 85   Temp 36.7 °C (98.1 °F) (Temporal)   Resp 18   Ht 1.753 m (5' 9\")   Wt 78.9 kg (174 lb)   SpO2 90%   BMI 25.70 kg/m²   Physical Exam  Vitals and nursing note reviewed.   Constitutional:       Comments: Patient is lying in bed supine, pleasant, conversant, speaking in complete sentences   HENT:      Head: Normocephalic and atraumatic.   Eyes:      Extraocular Movements: Extraocular movements intact.      Conjunctiva/sclera: Conjunctivae normal.      Pupils: Pupils are equal, round, and reactive to light.   Cardiovascular:      Pulses: Normal pulses.      Comments: HR 85  Pulmonary:      Effort: Pulmonary effort is normal. No respiratory distress.   Abdominal:      Comments: Abdomen is soft, non-tender, non-distended, non-rigid, no rebound, guarding, masses, no McBurney's point tenderness, no peritoneal signs, negative Rovsing sign, negative Zhou sign.  No CVA tenderness to palpation. Benign abdomen.   Musculoskeletal:         General: No swelling. Normal range of motion.      Cervical back: Normal range of motion. No rigidity.   Skin:     General: Skin is warm and dry.      Capillary Refill: Capillary refill takes less than 2 seconds.   Neurological:      Mental Status: He is alert.         Medical records reviewed for continuity of care.     Results for orders placed or performed during the hospital encounter of 04/21/22   URINE DRUG SCREEN (TRIAGE)   Result Value Ref Range    Amphetamines Urine Negative Negative    Barbiturates Negative Negative    Benzodiazepines Negative Negative    Cocaine Metabolite Negative Negative    Methadone Negative Negative    Opiates Negative Negative    Oxycodone Negative Negative    Phencyclidine -Pcp Negative Negative    Propoxyphene Negative Negative    Cannabinoid Metab Negative Negative   ETHYL ALCOHOL (BLOOD)   Result Value Ref Range    Diagnostic Alcohol 22.7 " (H) 0.0 - 10.0 mg/dL   CBC WITH DIFFERENTIAL   Result Value Ref Range    WBC 8.7 4.8 - 10.8 K/uL    RBC 4.36 (L) 4.70 - 6.10 M/uL    Hemoglobin 13.7 (L) 14.0 - 18.0 g/dL    Hematocrit 40.9 (L) 42.0 - 52.0 %    MCV 93.8 81.4 - 97.8 fL    MCH 31.4 27.0 - 33.0 pg    MCHC 33.5 (L) 33.7 - 35.3 g/dL    RDW 51.8 (H) 35.9 - 50.0 fL    Platelet Count 234 164 - 446 K/uL    MPV 10.4 9.0 - 12.9 fL    Neutrophils-Polys 70.20 44.00 - 72.00 %    Lymphocytes 14.90 (L) 22.00 - 41.00 %    Monocytes 8.60 0.00 - 13.40 %    Eosinophils 5.10 0.00 - 6.90 %    Basophils 0.90 0.00 - 1.80 %    Immature Granulocytes 0.30 0.00 - 0.90 %    Nucleated RBC 0.00 /100 WBC    Neutrophils (Absolute) 6.10 1.82 - 7.42 K/uL    Lymphs (Absolute) 1.30 1.00 - 4.80 K/uL    Monos (Absolute) 0.75 0.00 - 0.85 K/uL    Eos (Absolute) 0.44 0.00 - 0.51 K/uL    Baso (Absolute) 0.08 0.00 - 0.12 K/uL    Immature Granulocytes (abs) 0.03 0.00 - 0.11 K/uL    NRBC (Absolute) 0.00 K/uL   CMP   Result Value Ref Range    Sodium 136 135 - 145 mmol/L    Potassium 4.3 3.6 - 5.5 mmol/L    Chloride 103 96 - 112 mmol/L    Co2 23 20 - 33 mmol/L    Anion Gap 10.0 7.0 - 16.0    Glucose 105 (H) 65 - 99 mg/dL    Bun 10 8 - 22 mg/dL    Creatinine 0.87 0.50 - 1.40 mg/dL    Calcium 9.0 8.4 - 10.2 mg/dL    AST(SGOT) 22 12 - 45 U/L    ALT(SGPT) 19 2 - 50 U/L    Alkaline Phosphatase 110 (H) 30 - 99 U/L    Total Bilirubin 0.5 0.1 - 1.5 mg/dL    Albumin 3.5 3.2 - 4.9 g/dL    Total Protein 6.3 6.0 - 8.2 g/dL    Globulin 2.8 1.9 - 3.5 g/dL    A-G Ratio 1.3 g/dL   TSH   Result Value Ref Range    TSH 2.590 0.380 - 5.330 uIU/mL   URINALYSIS    Specimen: Urine   Result Value Ref Range    Color Yellow     Character Clear     Specific Gravity 1.015 <1.035    Ph 7.5 5.0 - 8.0    Glucose Negative Negative mg/dL    Ketones Negative Negative mg/dL    Protein Negative Negative mg/dL    Bilirubin Negative Negative    Nitrite Negative Negative    Leukocyte Esterase Negative Negative    Occult Blood Negative  Negative    Micro Urine Req see below    SARS-COV Antigen TOO   Result Value Ref Range    SARS-CoV-2 Source Nasal Swab     SARS-COV ANTIGEN TOO NotDetected NotDetected   VITAMIN B12   Result Value Ref Range    Vitamin B12 -True Cobalamin 189 (L) 211 - 911 pg/mL   FREE THYROXINE   Result Value Ref Range    Free T-4 1.22 0.93 - 1.70 ng/dL   FOLATE   Result Value Ref Range    Folate -Folic Acid 13.9 >4.0 ng/mL   ESTIMATED GFR   Result Value Ref Range    GFR (CKD-EPI) 93 >60 mL/min/1.73 m 2   DIFFERENTIAL COMMENT   Result Value Ref Range    Comments-Diff see below        Radiology:  No orders to display        MDM:  CMP demonstrates no evidence of acute kidney injury, acute electrolyte abnormality, acute liver failure, CBC demonstrates no evidence of acute anemia or leukocytosis.  No evidence of trauma based on physical exam or history.  IV fluids due to concern for decreased oral intake.  Given patient's history of alcohol withdrawal seizures and active hallucinations I am concerned about acute alcohol withdrawal.  Hospital medicine has been consulted for admission.    Electronically signed by: Alexei Mills M.D., 4/29/2022, 8:39 PM    Dr Carter has graciously accepted the patient to his service for admission for acute alcohol withdrawal.    This dictation has been created using voice recognition software. I am continuously working with the software to minimize the number of voice recognition errors and I have made every attempt to manually correct the errors within my dictation. However errors  related to this voice recognition software may still exist and should be interpreted within the appropriate context.     Electronically signed by: Alexei Mills M.D., 4/29/2022 8:55 PM      Disposition:  Hospital medicine floor    Final Impression:  1. Alcohol withdrawal syndrome, with delirium (HCC)    2. Altered mental status, unspecified altered mental status type

## 2022-04-30 NOTE — RESPIRATORY CARE
COPD EDUCATION by COPD CLINICAL EDUCATOR  4/30/2022 at 9:02 AM by Brenda Mendoza, RRT     Patient interviewed by COPD education team. Patient refused COPD education and discussion about smoking cessation He is not on any Respiratory Medication.

## 2022-05-01 VITALS
DIASTOLIC BLOOD PRESSURE: 82 MMHG | OXYGEN SATURATION: 95 % | HEART RATE: 66 BPM | TEMPERATURE: 97.9 F | BODY MASS INDEX: 28.73 KG/M2 | HEIGHT: 69 IN | WEIGHT: 194 LBS | RESPIRATION RATE: 18 BRPM | SYSTOLIC BLOOD PRESSURE: 138 MMHG

## 2022-05-01 LAB
ALBUMIN SERPL BCP-MCNC: 3.5 G/DL (ref 3.2–4.9)
ALBUMIN/GLOB SERPL: 1.3 G/DL
ALP SERPL-CCNC: 111 U/L (ref 30–99)
ALT SERPL-CCNC: 18 U/L (ref 2–50)
ANION GAP SERPL CALC-SCNC: 9 MMOL/L (ref 7–16)
AST SERPL-CCNC: 18 U/L (ref 12–45)
BILIRUB SERPL-MCNC: 0.2 MG/DL (ref 0.1–1.5)
BUN SERPL-MCNC: 21 MG/DL (ref 8–22)
CALCIUM SERPL-MCNC: 9 MG/DL (ref 8.5–10.5)
CHLORIDE SERPL-SCNC: 104 MMOL/L (ref 96–112)
CO2 SERPL-SCNC: 23 MMOL/L (ref 20–33)
CREAT SERPL-MCNC: 0.78 MG/DL (ref 0.5–1.4)
GFR SERPLBLD CREATININE-BSD FMLA CKD-EPI: 96 ML/MIN/1.73 M 2
GLOBULIN SER CALC-MCNC: 2.7 G/DL (ref 1.9–3.5)
GLUCOSE SERPL-MCNC: 128 MG/DL (ref 65–99)
MAGNESIUM SERPL-MCNC: 2 MG/DL (ref 1.5–2.5)
POTASSIUM SERPL-SCNC: 4.5 MMOL/L (ref 3.6–5.5)
PROT SERPL-MCNC: 6.2 G/DL (ref 6–8.2)
SODIUM SERPL-SCNC: 136 MMOL/L (ref 135–145)

## 2022-05-01 PROCEDURE — 700111 HCHG RX REV CODE 636 W/ 250 OVERRIDE (IP): Performed by: STUDENT IN AN ORGANIZED HEALTH CARE EDUCATION/TRAINING PROGRAM

## 2022-05-01 PROCEDURE — A9270 NON-COVERED ITEM OR SERVICE: HCPCS | Performed by: STUDENT IN AN ORGANIZED HEALTH CARE EDUCATION/TRAINING PROGRAM

## 2022-05-01 PROCEDURE — 36415 COLL VENOUS BLD VENIPUNCTURE: CPT

## 2022-05-01 PROCEDURE — 80053 COMPREHEN METABOLIC PANEL: CPT

## 2022-05-01 PROCEDURE — 99239 HOSP IP/OBS DSCHRG MGMT >30: CPT | Performed by: STUDENT IN AN ORGANIZED HEALTH CARE EDUCATION/TRAINING PROGRAM

## 2022-05-01 PROCEDURE — 83735 ASSAY OF MAGNESIUM: CPT

## 2022-05-01 PROCEDURE — 700102 HCHG RX REV CODE 250 W/ 637 OVERRIDE(OP): Performed by: STUDENT IN AN ORGANIZED HEALTH CARE EDUCATION/TRAINING PROGRAM

## 2022-05-01 RX ADMIN — FOLIC ACID 1 MG: 1 TABLET ORAL at 06:12

## 2022-05-01 RX ADMIN — MELOXICAM 15 MG: 7.5 TABLET ORAL at 06:12

## 2022-05-01 RX ADMIN — THIAMINE HCL TAB 100 MG 100 MG: 100 TAB at 06:11

## 2022-05-01 RX ADMIN — THERA TABS 1 TABLET: TAB at 06:11

## 2022-05-01 RX ADMIN — LORAZEPAM 1 MG: 1 TABLET ORAL at 06:14

## 2022-05-01 RX ADMIN — CYANOCOBALAMIN TAB 500 MCG 1000 MCG: 500 TAB at 06:11

## 2022-05-01 ASSESSMENT — LIFESTYLE VARIABLES
AUDITORY DISTURBANCES: NOT PRESENT
ANXIETY: MILDLY ANXIOUS
TREMOR: *
PAROXYSMAL SWEATS: *
NAUSEA AND VOMITING: MILD NAUSEA WITH NO VOMITING
AGITATION: SOMEWHAT MORE THAN NORMAL ACTIVITY
ORIENTATION AND CLOUDING OF SENSORIUM: DATE DISORIENTATION BY NO MORE THAN TWO CALENDAR DAYS
TOTAL SCORE: 10
VISUAL DISTURBANCES: NOT PRESENT
HEADACHE, FULLNESS IN HEAD: VERY MILD

## 2022-05-01 NOTE — PROGRESS NOTES
"Received drowsy and oriented x 3.  On CIWA protocol. Check vitals sign and recorded accordingly and due med given per MAR. Monitor sign and symptoms of respiratory distress and treatment given accordingly per MAR.Medicated per MAR and reassessed every 2 hours per protocol. Call light within reach. Bed alarm in placed. Needs attended. Will continue to monitor./56   Pulse 88   Temp 36.9 °C (98.4 °F) (Temporal)   Resp (!) 28   Ht 1.753 m (5' 9\")   Wt 88 kg (194 lb 0.1 oz)   SpO2 98%   BMI 28.65 kg/m² .  "

## 2022-05-01 NOTE — DISCHARGE SUMMARY
Discharge Summary    CHIEF COMPLAINT ON ADMISSION  Chief Complaint   Patient presents with   • Alcohol Intoxication     BIB EMS. Requesting detox from alcohol. This is his 5th visit here this month.   BS: 93.   • Detox       Reason for Admission  ETOH     Admission Date  4/29/2022    CODE STATUS  Full Code    HPI & HOSPITAL COURSE  70 yo male with history of alcohol abuse and history of alcohol withdrawal who presents to the hospital with symptoms of alcohol withdrawal symptoms and desire to detox, this is the patients 5 visit for the same this month.   On arrival pt was hypotensive with BP 82/54 otherwise stable vitals, labs with chronic anemia. He was treated with IV fluids, elecrolytes/vitamins and monitored on CIWA protocol. On morning of 5/1 patient requested to leave against medical advice, patient left prior to my ability to do physical exam however he was seen leaving the unit, alert and oriented, walking independently and speaking clearly.       Therefore, he is discharged in fair and stable condition against medcial advice.    The patient recovered much more quickly than anticipated on admission.    Discharge Date  5/1/2022    FOLLOW UP ITEMS POST DISCHARGE  5/1/2022    DISCHARGE DIAGNOSES  Principal Problem:    Alcohol withdrawal with delirium (HCC) POA: Unknown  Active Problems:    B12 deficiency POA: Unknown    Metabolic acidosis POA: Yes  Resolved Problems:    * No resolved hospital problems. *      FOLLOW UP  No future appointments.  No follow-up provider specified.    MEDICATIONS ON DISCHARGE     Medication List      ASK your doctor about these medications      Instructions   melatonin 3 MG Tabs   Take 3 mg by mouth at bedtime.  Dose: 3 mg     meloxicam 15 MG tablet  Commonly known as: MOBIC   Take 15 mg by mouth every day.  Dose: 15 mg     traZODone 50 MG Tabs  Commonly known as: DESYREL   Take 50 mg by mouth every evening.  Dose: 50 mg            Allergies  Allergies   Allergen Reactions   •  Benadryl Allergy Unspecified     Pt states that it exacerbates his restless leg syndrome.    • Seroquel [Quetiapine] Unspecified     Pt states that it exacerbates his restless leg syndrome.        DIET  Orders Placed This Encounter   Procedures   • Diet Order Diet: Regular     Standing Status:   Standing     Number of Occurrences:   1     Order Specific Question:   Diet:     Answer:   Regular [1]       ACTIVITY  As tolerated.  NA    CONSULTATIONS  NA    PROCEDURES  NA    LABORATORY  Lab Results   Component Value Date    SODIUM 136 05/01/2022    POTASSIUM 4.5 05/01/2022    CHLORIDE 104 05/01/2022    CO2 23 05/01/2022    GLUCOSE 128 (H) 05/01/2022    BUN 21 05/01/2022    CREATININE 0.78 05/01/2022        Lab Results   Component Value Date    WBC 8.8 04/29/2022    HEMOGLOBIN 13.3 (L) 04/29/2022    HEMATOCRIT 39.9 (L) 04/29/2022    PLATELETCT 297 04/29/2022        Total time of the discharge process exceeds 31 minutes.

## 2022-05-01 NOTE — CARE PLAN
The patient is Stable - Low risk of patient condition declining or worsening    Shift Goals  Clinical Goals: Monitor alcohol withdrawal  Patient Goals: sleep well    Progress made toward(s) clinical / shift goals:  Continued alcohol withdrawal. Kept bed alarm checked every shift.  Problem: Knowledge Deficit - Standard  Goal: Patient and family/care givers will demonstrate understanding of plan of care, disease process/condition, diagnostic tests and medications  Outcome: Progressing     Problem: Optimal Care for Alcohol Withdrawal  Goal: Optimal Care for the alcohol withdrawal patient  Outcome: Progressing     Problem: Seizure Precautions  Goal: Implementation of seizure precautions  Outcome: Progressing     Problem: Psychosocial  Goal: Patient's level of anxiety will decrease  Outcome: Progressing     Problem: Risk for Aspiration  Goal: Patient's risk for aspiration will be absent or decrease  Outcome: Progressing     Problem: Pain - Standard  Goal: Alleviation of pain or a reduction in pain to the patient’s comfort goal  Outcome: Progressing       Patient is not progressing towards the following goals:

## 2022-05-01 NOTE — PROGRESS NOTES
Patient leaving AMA per pm nurse on shift arrival, patient left unit approximately 0725.  IV discontinued.  Dr. Lopez aware.

## 2022-05-02 ENCOUNTER — HOSPITAL ENCOUNTER (EMERGENCY)
Facility: MEDICAL CENTER | Age: 72
End: 2022-05-04
Attending: EMERGENCY MEDICINE
Payer: COMMERCIAL

## 2022-05-02 DIAGNOSIS — R45.851 SUICIDAL IDEATION: ICD-10-CM

## 2022-05-02 DIAGNOSIS — F10.10 ALCOHOL ABUSE: ICD-10-CM

## 2022-05-02 LAB
ALBUMIN SERPL BCP-MCNC: 4 G/DL (ref 3.2–4.9)
ALBUMIN/GLOB SERPL: 1.4 G/DL
ALP SERPL-CCNC: 119 U/L (ref 30–99)
ALT SERPL-CCNC: 19 U/L (ref 2–50)
ANION GAP SERPL CALC-SCNC: 15 MMOL/L (ref 7–16)
AST SERPL-CCNC: 21 U/L (ref 12–45)
BASOPHILS # BLD AUTO: 0.5 % (ref 0–1.8)
BASOPHILS # BLD: 0.04 K/UL (ref 0–0.12)
BILIRUB SERPL-MCNC: 0.2 MG/DL (ref 0.1–1.5)
BUN SERPL-MCNC: 7 MG/DL (ref 8–22)
CALCIUM SERPL-MCNC: 9 MG/DL (ref 8.5–10.5)
CHLORIDE SERPL-SCNC: 102 MMOL/L (ref 96–112)
CO2 SERPL-SCNC: 20 MMOL/L (ref 20–33)
CREAT SERPL-MCNC: 0.72 MG/DL (ref 0.5–1.4)
EOSINOPHIL # BLD AUTO: 0.32 K/UL (ref 0–0.51)
EOSINOPHIL NFR BLD: 4.1 % (ref 0–6.9)
ERYTHROCYTE [DISTWIDTH] IN BLOOD BY AUTOMATED COUNT: 48.4 FL (ref 35.9–50)
ETHANOL BLD-MCNC: 227 MG/DL (ref 0–10)
GFR SERPLBLD CREATININE-BSD FMLA CKD-EPI: 99 ML/MIN/1.73 M 2
GLOBULIN SER CALC-MCNC: 2.8 G/DL (ref 1.9–3.5)
GLUCOSE BLD STRIP.AUTO-MCNC: 93 MG/DL (ref 65–99)
GLUCOSE SERPL-MCNC: 91 MG/DL (ref 65–99)
HCT VFR BLD AUTO: 38.8 % (ref 42–52)
HGB BLD-MCNC: 13.2 G/DL (ref 14–18)
IMM GRANULOCYTES # BLD AUTO: 0.03 K/UL (ref 0–0.11)
IMM GRANULOCYTES NFR BLD AUTO: 0.4 % (ref 0–0.9)
LYMPHOCYTES # BLD AUTO: 2.06 K/UL (ref 1–4.8)
LYMPHOCYTES NFR BLD: 26.1 % (ref 22–41)
MCH RBC QN AUTO: 30.8 PG (ref 27–33)
MCHC RBC AUTO-ENTMCNC: 34 G/DL (ref 33.7–35.3)
MCV RBC AUTO: 90.7 FL (ref 81.4–97.8)
MONOCYTES # BLD AUTO: 0.91 K/UL (ref 0–0.85)
MONOCYTES NFR BLD AUTO: 11.5 % (ref 0–13.4)
NEUTROPHILS # BLD AUTO: 4.53 K/UL (ref 1.82–7.42)
NEUTROPHILS NFR BLD: 57.4 % (ref 44–72)
NRBC # BLD AUTO: 0 K/UL
NRBC BLD-RTO: 0 /100 WBC
PLATELET # BLD AUTO: 303 K/UL (ref 164–446)
PMV BLD AUTO: 9.3 FL (ref 9–12.9)
POC BREATHALIZER: 0.21 PERCENT (ref 0–0.01)
POTASSIUM SERPL-SCNC: 4 MMOL/L (ref 3.6–5.5)
PROT SERPL-MCNC: 6.8 G/DL (ref 6–8.2)
RBC # BLD AUTO: 4.28 M/UL (ref 4.7–6.1)
SODIUM SERPL-SCNC: 137 MMOL/L (ref 135–145)
WBC # BLD AUTO: 7.9 K/UL (ref 4.8–10.8)

## 2022-05-02 PROCEDURE — 36415 COLL VENOUS BLD VENIPUNCTURE: CPT

## 2022-05-02 PROCEDURE — 85025 COMPLETE CBC W/AUTO DIFF WBC: CPT

## 2022-05-02 PROCEDURE — 99285 EMERGENCY DEPT VISIT HI MDM: CPT

## 2022-05-02 PROCEDURE — 80053 COMPREHEN METABOLIC PANEL: CPT

## 2022-05-02 PROCEDURE — 302970 POC BREATHALIZER

## 2022-05-02 PROCEDURE — 82077 ASSAY SPEC XCP UR&BREATH IA: CPT

## 2022-05-02 ASSESSMENT — FIBROSIS 4 INDEX: FIB4 SCORE: 0.99

## 2022-05-03 LAB — POC BREATHALIZER: 0.06 PERCENT (ref 0–0.01)

## 2022-05-03 PROCEDURE — 700102 HCHG RX REV CODE 250 W/ 637 OVERRIDE(OP): Performed by: EMERGENCY MEDICINE

## 2022-05-03 PROCEDURE — A9270 NON-COVERED ITEM OR SERVICE: HCPCS | Performed by: EMERGENCY MEDICINE

## 2022-05-03 PROCEDURE — 302970 POC BREATHALIZER

## 2022-05-03 PROCEDURE — 90791 PSYCH DIAGNOSTIC EVALUATION: CPT

## 2022-05-03 RX ORDER — ALBUTEROL SULFATE 90 UG/1
2 AEROSOL, METERED RESPIRATORY (INHALATION) EVERY 4 HOURS PRN
Status: DISCONTINUED | OUTPATIENT
Start: 2022-05-03 | End: 2022-05-04 | Stop reason: HOSPADM

## 2022-05-03 RX ORDER — CHOLECALCIFEROL (VITAMIN D3) 125 MCG
5 CAPSULE ORAL NIGHTLY
Status: DISCONTINUED | OUTPATIENT
Start: 2022-05-03 | End: 2022-05-04 | Stop reason: HOSPADM

## 2022-05-03 RX ORDER — TRAZODONE HYDROCHLORIDE 50 MG/1
50 TABLET ORAL
Status: DISCONTINUED | OUTPATIENT
Start: 2022-05-03 | End: 2022-05-04 | Stop reason: HOSPADM

## 2022-05-03 RX ORDER — HYDROXYZINE HYDROCHLORIDE 25 MG/1
25-50 TABLET, FILM COATED ORAL 4 TIMES DAILY PRN
Status: DISCONTINUED | OUTPATIENT
Start: 2022-05-03 | End: 2022-05-04 | Stop reason: HOSPADM

## 2022-05-03 RX ADMIN — ALBUTEROL SULFATE 2 PUFF: 90 AEROSOL, METERED RESPIRATORY (INHALATION) at 20:46

## 2022-05-03 RX ADMIN — HYDROXYZINE HYDROCHLORIDE 25 MG: 25 TABLET, FILM COATED ORAL at 17:22

## 2022-05-03 RX ADMIN — Medication 5 MG: at 20:47

## 2022-05-03 RX ADMIN — TRAZODONE HYDROCHLORIDE 50 MG: 50 TABLET ORAL at 20:23

## 2022-05-03 NOTE — ED NOTES
"Pt yelling at nursing staff that he needs to urinate, pt given urinal but pt threw urinal to the ground and started yelling \"I cant fucking walk to the bathroom?\" pt previously could not walk without being unsteady on feet when getting changed into gown. Pt walked to bathroom with tech and given warm blanket. Security called on standby outside of room.   "

## 2022-05-03 NOTE — ED NOTES
Pt states still feeling suicidal, denies any history of attempt  Does not want to talk about how he is currently feeling   Sitter remains outside of pt room in view at all times

## 2022-05-03 NOTE — ED NOTES
Pt resting at this time, NAD noted, even chest rise and fall. In direct line of sight of observer.

## 2022-05-03 NOTE — ED NOTES
Pt placed in green 33, all belongings out of room pt in hospital gown. All medical equipment out of room except monitor.

## 2022-05-03 NOTE — ED NOTES
"Rounded on pt, vitals checked.  Pt requesting anxiety medication \"ativan or vistaril works\".      Pt remains in view of sitter at all times.    "

## 2022-05-03 NOTE — PROGRESS NOTES
"ED Provider Progress Note    ED Observation Progress Note    Date of Service: 05/03/22    Interval History  Patient is now sober continues to do expressed suicidal ideation.  He is unclear to contract for safety.  Has been seen and evaluated by behavioral health are concerned that he has a suicidal risk at this time.  His home medications have been reinitiated by recommendation Pharm.D.  Patient will be transferred to the next available psychiatric facility.  He has been comfortably resting throughout my shift and transferred to oncoming ER practitioner.    Physical Exam  /67   Pulse 64   Temp 36.9 °C (98.4 °F) (Temporal)   Resp 16   Ht 1.778 m (5' 10\")   Wt 81.6 kg (180 lb)   SpO2 92%   BMI 25.83 kg/m² .    Constitutional: Awake and alert. Nontoxic  HENT:  Grossly normal  Eyes: Grossly normal  Neck: Normal range of motion  Cardiovascular: Normal heart rate   Thorax & Lungs: No respiratory distress  Abdomen: Nontender  Skin:  No pathologic rash.   Extremities: Well perfused  Psychiatric: Affect normal    Labs  Results for orders placed or performed during the hospital encounter of 05/02/22   CBC WITH DIFFERENTIAL   Result Value Ref Range    WBC 7.9 4.8 - 10.8 K/uL    RBC 4.28 (L) 4.70 - 6.10 M/uL    Hemoglobin 13.2 (L) 14.0 - 18.0 g/dL    Hematocrit 38.8 (L) 42.0 - 52.0 %    MCV 90.7 81.4 - 97.8 fL    MCH 30.8 27.0 - 33.0 pg    MCHC 34.0 33.7 - 35.3 g/dL    RDW 48.4 35.9 - 50.0 fL    Platelet Count 303 164 - 446 K/uL    MPV 9.3 9.0 - 12.9 fL    Neutrophils-Polys 57.40 44.00 - 72.00 %    Lymphocytes 26.10 22.00 - 41.00 %    Monocytes 11.50 0.00 - 13.40 %    Eosinophils 4.10 0.00 - 6.90 %    Basophils 0.50 0.00 - 1.80 %    Immature Granulocytes 0.40 0.00 - 0.90 %    Nucleated RBC 0.00 /100 WBC    Neutrophils (Absolute) 4.53 1.82 - 7.42 K/uL    Lymphs (Absolute) 2.06 1.00 - 4.80 K/uL    Monos (Absolute) 0.91 (H) 0.00 - 0.85 K/uL    Eos (Absolute) 0.32 0.00 - 0.51 K/uL    Baso (Absolute) 0.04 0.00 - 0.12 " K/uL    Immature Granulocytes (abs) 0.03 0.00 - 0.11 K/uL    NRBC (Absolute) 0.00 K/uL   Comp Metabolic Panel   Result Value Ref Range    Sodium 137 135 - 145 mmol/L    Potassium 4.0 3.6 - 5.5 mmol/L    Chloride 102 96 - 112 mmol/L    Co2 20 20 - 33 mmol/L    Anion Gap 15.0 7.0 - 16.0    Glucose 91 65 - 99 mg/dL    Bun 7 (L) 8 - 22 mg/dL    Creatinine 0.72 0.50 - 1.40 mg/dL    Calcium 9.0 8.5 - 10.5 mg/dL    AST(SGOT) 21 12 - 45 U/L    ALT(SGPT) 19 2 - 50 U/L    Alkaline Phosphatase 119 (H) 30 - 99 U/L    Total Bilirubin 0.2 0.1 - 1.5 mg/dL    Albumin 4.0 3.2 - 4.9 g/dL    Total Protein 6.8 6.0 - 8.2 g/dL    Globulin 2.8 1.9 - 3.5 g/dL    A-G Ratio 1.4 g/dL   DIAGNOSTIC ALCOHOL   Result Value Ref Range    Diagnostic Alcohol 227.0 (H) 0.0 - 10.0 mg/dL   ESTIMATED GFR   Result Value Ref Range    GFR (CKD-EPI) 99 >60 mL/min/1.73 m 2   POC BREATHALIZER   Result Value Ref Range    POC Breathalizer 0.208 (A) 0.00 - 0.01 Percent   POC BREATHALIZER   Result Value Ref Range    POC Breathalizer 0.059 (A) 0.00 - 0.01 Percent       Problem List  1.  Alcohol intoxication  2.  Suicidal ideation      Electronically signed by: Bubba Yu M.D., 5/3/2022 2:01 PM

## 2022-05-03 NOTE — ED TRIAGE NOTES
"Chief Complaint   Patient presents with   • Suicidal Ideation     Pt was at a gas station with a knife and verbalizing intent to kill himself to bystanders   • ETOH Intoxication     Pt states he usually drinks 1/2 gal of vodka a day, but has had more than that today       Pt BIB EMS from a gas station with the above complaint. Pt is stating he wishes to die and asking directly, \"I have a right to die, right?\"     /70   Pulse 90   Temp 36.4 °C (97.5 °F) (Temporal)   Resp 14   Ht 1.778 m (5' 10\")   Wt 81.6 kg (180 lb)   SpO2 91%   BMI 25.83 kg/m²         "

## 2022-05-03 NOTE — DISCHARGE PLANNING
[facility] ALERT TEAM DISCHARGE PLANNING NOTE    Date:  5/3/22  Patient Name:  Mohsen Sanchez y.o. - Discharge Planning  MRN:  [id number]   YOB: 1950  ADMISSION DATE:  5/2/2022      Writer forwarded transfer packet for inpatient psychiatric care to the following community providers:  [facility], [facility], [facility], [facility]   Items  included in the transfer packet:   __x___Face Sheet   __x___Pages 1 and 2 of completed legal hold   __x___Alert Team/Psych Assessment   __x___H&P   _____UDS   __x___Blood Alcohol   __x___Vital signs   __n/a___Pregnancy Test (if applicable)   __x___Medications List   _____Covid Screen

## 2022-05-03 NOTE — ED NOTES
Pt resting at this time, NAD noted, even chest rise and fall. staff in direct line of sight of pt.

## 2022-05-03 NOTE — CONSULTS
"[facility]   TRIAGE ASSESSMENT    Name: Mohsen Adler  MRN: [id number]  : 1950  Age: 71 y.o.  Date of assessment: 5/3/2022  PCP: Pcp Pt States None  Persons in attendance: Patient  Patient Location: [facility]    CHIEF COMPLAINT/PRESENTING ISSUE (as stated by patient): Pt states \"leave me alone!\" Does not participate in assessment; information obtained via legal hold and hospital records.  Per hospital record, pt drank one gallon vodka , which is more than his usual 1/2 gallon. KIMANI 0.208 at 2102. He was under the legal limit 0.059 at 0510. He was at a gas station and he was waving a knife around and telling bystanders that he wanted to kill himself. He states he will actually kill himself if he is not released from the hospital. He presented to the ED for ETOH detox and SI on 22, 22, 22, and 22. He receives $1000 in social security benefits monthly and he has money for an apartment yet he states he lives \"on the street.\" States he has an apartment in [place] but cannot state why he is not there.    Chief Complaint   Patient presents with   • Suicidal Ideation     Pt was at a gas station with a knife and verbalizing intent to kill himself to bystanders   • ETOH Intoxication     Pt states he usually drinks 1/2 gal of vodka a day, but has had more than that today        CURRENT LIVING SITUATION/SOCIAL SUPPORT/FINANCIAL RESOURCES: Residence location: [place]  Next:[facility]. Residence location: [place]  Next:[facility]. Currently homeless in [place].     BEHAVIORAL HEALTH/SUBSTANCE USE TREATMENT HISTORY  Does patient/parent report a history of prior behavioral health/substance use treatment for patient?   Yes:    Dates Level of Care Facilty/Provider Diagnosis/Problem Medications   [facility]); Trazodone; Seroquel; Melatonin; Pain-killer mood stabilizer.    [facility]    [facility] "    [facility]    [facility]/22 inpt [facility] ETOH detox                  /                           SAFETY ASSESSMENT - SELF  Does patient acknowledge current or past symptoms of dangerousness to self or is previous history noted? yes  Does parent/significant other report patient has current or past symptoms of dangerousness to self? yes  Does presenting problem suggest symptoms of dangerousness to self? Yes:     Past Current    Suicidal Thoughts: [x]  [x]    Suicidal Plans: [x]  [x]    Suicidal Intent: []  [x]    Suicide Attempts: []  []    Self-Injury []  []      For any boxes checked above, provide detail: He has had four presentations to the ED in the month of April. [facility]     History of suicide by family member: Refuses to answer  History of suicide by friend/significant other: Refuses to answer  Recent change in frequency/specificity/intensity of suicidal thoughts or self-harm behavior? Refuses to answer  Current access to firearms, medications, or other identified means of suicide/self-harm?Refuses to answer  If yes, willing to restrict access to means of suicide/self-harm?Refuses to answer  Protective factors present:  Refuses to answer.    SAFETY ASSESSMENT - OTHERS  Does patient acknowledge current or past symptoms of aggressive behavior or risk to others or is previous history noted? Refuses to answer  Does parent/significant other report patient has current or past symptoms of aggressive behavior or risk to others?  Refuses to answer  Does presenting problem suggest symptoms of dangerousness to others?      Recent change in frequency/specificity/intensity of thoughts or threats to harm others? no    Current access to firearms/other identified means of harm? yes - Threatened suicide using a knife.  If yes, willing to restrict access to weapons/means of harm? no  Protective factors present: Refuses to answer  Based on information provided during the current  "assessment, is a mandated “duty to warn” being exercised? No    LEGAL HISTORY  Does patient acknowledge history of arrest/correction/prison or is previous history noted? no    Crisis Safety Plan completed and copy given to patient? N\A    ABUSE/NEGLECT SCREENING  Does patient report feeling “unsafe” in his/her home, or afraid of anyone?  no  Does patient report any history of physical, sexual, or emotional abuse?  no  Does parent or significant other report any of the above? no and N\A  Is there evidence of neglect by self?  yes  Is there evidence of neglect by a caregiver? no  Does the patient/parent report any history of CPS/APS/police involvement related to suspected abuse/neglect or domestic violence? no  Based on the information provided during the current assessment, is a mandated report of suspected abuse/neglect being made?  No    SUBSTANCE USE SCREENING  Yes:  [place] all substances used in the past 30 days:      Last Use Amount   [x]   Alcohol 5/2/22 1/2 gallon daily   []   Marijuana     []   Heroin     []   Prescription Opioids  (used without prescription, for    recreation, or in excess of prescribed amount)     []   Other Prescription  (used without prescription, for    recreation, or in excess of prescribed amount)     []   Cocaine      []   Methamphetamine     []   \"\" drugs (ectasy, MDMA)     []   Other substances        UDS results: pending  Breathalyzer results: .0208 at 2102, 0.59 at 0510    What consequences does the patient associate with any of the above substance use and or addictive behaviors? Monetary problems:      Risk factors for detox (check all that apply):  []  Seizures   []  Diaphoretic (sweating)   []  Tremors   []  Hallucinations   []  Increased blood pressure   []  Decreased blood pressure   [x]  Other   []  None      [x] Patient education on risk factors for detoxification and instructed to return to ER as needed.      MENTAL STATUS   Participation: No verbal " participation  Grooming: Appropriate, clean shaved  Orientation: Drowsy/Somnolent  Behavior: Agitated  Eye contact: Poor  Mood: Angry and Irritable  Affect: Incongruent with content  Thought process: Logical  Thought content: Within normal limits  Speech: Rate within normal limits  Perception: Refuses to answer  Memory:  Refuses to answer  Insight: Poor  Judgment:  Poor  Other:    Collateral information: Patient does not participate in assessment.  Source:  [] Significant other present in person:   [] Significant other by telephone  [] [facility]   [x] [facility] Staff  [x] [facility]  [] Other:     [] Unable to complete full assessment due to:  [] Acute intoxication  [x] Patient declined to participate/engage  [] Patient verbally unresponsive  [] Significant cognitive deficits  [] Significant perceptual distortions or behavioral disorganization  [] Other:      CLINICAL IMPRESSIONS:  Primary:  Alcohol intoxication  Secondary:  Suicidal ideation       IDENTIFIED NEEDS/PLAN:  [Trigger DISPOSITION list for any items marked]    []  Imminent safety risk - self [] Imminent safety risk - others   []  Acute substance withdrawal []  Psychosis/Impaired reality testing   []  Mood/anxiety [x]  Substance use/Addictive behavior   []  Maladaptive behaviro []  Parent/child conflict   []  Family/Couples conflict []  Biomedical   []  Housing []  Financial   []   Legal  Occupational/Educational   []  Domestic violence []  Other:     Recommended Plan of Care:  Actively being addressed by Legal Hold and safety sitter  *Telesitter may not be utilized for moderate or high risk patients    Has the Recommended Plan of Care/Level of Observation been reviewed with the patient's assigned nurse? yes    Does patient/parent or guardian express agreement with the above plan? No, Refuses to answer  Referral appointment(s) scheduled? no    Alert team only:   I have discussed findings and recommendations with Dr. Yu who  is in agreement with these recommendations.     Referral information sent to the following inpatient community providers : [facility], [facility], [facility], [facility]      Kareen Soto R.N.  5/3/2022

## 2022-05-03 NOTE — PROGRESS NOTES
Patient's home medications have been reviewed by the pharmacy team.     Patient with a CC of SI/EtOH abuse    Past Medical History:   Diagnosis Date   • Alcohol abuse    • Chronic obstructive pulmonary disease (HCC)        Patient's Medications   New Prescriptions    No medications on file   Previous Medications    MELATONIN 3 MG TAB    Take 3 mg by mouth at bedtime.    TRAZODONE (DESYREL) 50 MG TAB    Take 50 mg by mouth every evening.   Modified Medications    No medications on file   Discontinued Medications    MELOXICAM (MOBIC) 15 MG TABLET    Take 15 mg by mouth every day.          A:  Medications do not appear to be contributing to current complaints.       P:    No recommendations at this time. Home medications have been reordered as appropriate.    Jose Wilson, PharmD, PGY2 Critical Care Pharmacy Resident

## 2022-05-03 NOTE — ED PROVIDER NOTES
"ED Provider Note    ED Provider Note    Scribed for Luzmaria Horowitz MD by Luzmaria Horowitz M.D.. 5/2/2022, 9:09 PM.    Primary care provider: Pcp Pt States None  Means of arrival: EMS  History obtained from: Pt and EMS report  History limited by: Significant intoxication    CHIEF COMPLAINT  Chief Complaint   Patient presents with   • Suicidal Ideation     Pt was at a gas station with a knife and verbalizing intent to kill himself to bystanders   • ETOH Intoxication     Pt states he usually drinks 1/2 gal of vodka a day, but has had more than that today       HPI  Mohsen Adler is a 71 y.o. male who presents to the Emergency Department for evaluation of suicidal ideation.  Patient very intoxicated, relates he usually drinks about half a gallon of vodka daily but drink more so today.  He has been seen several times for problems secondary to alcohol over ingestion in this ED.  This evening patient was at a gas station will need a knife and verbalizing to bystanders he was going to kill himself.  He tells me stressors include \"mom and dad\".  Patient otherwise is very intoxicated but is cooperative.  No actual self-injurious behavior.    REVIEW OF SYSTEMS  Pertinent positives include alcohol intoxication, suicidal ideation. Pertinent negatives include no chas injury nor active bleeding.  All other systems reviewed and negative.    PAST MEDICAL HISTORY   has a past medical history of Alcohol abuse and Chronic obstructive pulmonary disease (HCC).    SURGICAL HISTORY   has a past surgical history that includes other orthopedic surgery.    SOCIAL HISTORY  Social History     Tobacco Use   • Smoking status: Current Every Day Smoker     Packs/day: 1.00     Types: Cigarettes   • Smokeless tobacco: Never Used   Vaping Use   • Vaping Use: Never used   Substance Use Topics   • Alcohol use: Yes     Comment: 1/2 gal of vodka per day, \"whenever I get a chance\"   • Drug use: Yes      Social History     Substance and " "Sexual Activity   Drug Use Yes       FAMILY HISTORY  Noncontributory    CURRENT MEDICATIONS  Home Medications    **Home medications have not yet been reviewed for this encounter**         ALLERGIES  Allergies   Allergen Reactions   • Benadryl Allergy Unspecified     Pt states that it exacerbates his restless leg syndrome.    • Seroquel [Quetiapine] Unspecified     Pt states that it exacerbates his restless leg syndrome.        PHYSICAL EXAM  VITAL SIGNS: /70   Pulse 65   Temp 36.4 °C (97.5 °F) (Temporal)   Resp 16   Ht 1.778 m (5' 10\")   Wt 81.6 kg (180 lb)   SpO2 95%   BMI 25.83 kg/m²     General: Alert, no acute distress  Skin: Warm, dry, normal for ethnicity  Head: Normocephalic, atraumatic  Neck: Trachea midline, no tenderness  Eye: PERRL, normal conjunctiva, sclera anicteric  ENMT: Oral mucosa pink and dry  Cardiovascular: Regular rate and rhythm, No murmur, Normal peripheral perfusion  Respiratory: Lungs CTA, respirations are non-labored, breath sounds are equal  Gastrointestinal: Soft, nontender, non distended  Musculoskeletal: No swelling, no deformity  Neurological: Alert and oriented to person, place, time, and situation; intoxicated and somnolent.  Upper and lower extremity strength and sensation 5 x 5 and symmetrical bilaterally.  Psychiatric: Cooperative, depressed with mood congruent affect, clinically intoxicated.  Not responding to internal stimuli.      DIAGNOSTIC STUDIES/PROCEDURES    LABS  Results for orders placed or performed during the hospital encounter of 05/02/22   CBC WITH DIFFERENTIAL   Result Value Ref Range    WBC 7.9 4.8 - 10.8 K/uL    RBC 4.28 (L) 4.70 - 6.10 M/uL    Hemoglobin 13.2 (L) 14.0 - 18.0 g/dL    Hematocrit 38.8 (L) 42.0 - 52.0 %    MCV 90.7 81.4 - 97.8 fL    MCH 30.8 27.0 - 33.0 pg    MCHC 34.0 33.7 - 35.3 g/dL    RDW 48.4 35.9 - 50.0 fL    Platelet Count 303 164 - 446 K/uL    MPV 9.3 9.0 - 12.9 fL    Neutrophils-Polys 57.40 44.00 - 72.00 %    Lymphocytes 26.10 " 22.00 - 41.00 %    Monocytes 11.50 0.00 - 13.40 %    Eosinophils 4.10 0.00 - 6.90 %    Basophils 0.50 0.00 - 1.80 %    Immature Granulocytes 0.40 0.00 - 0.90 %    Nucleated RBC 0.00 /100 WBC    Neutrophils (Absolute) 4.53 1.82 - 7.42 K/uL    Lymphs (Absolute) 2.06 1.00 - 4.80 K/uL    Monos (Absolute) 0.91 (H) 0.00 - 0.85 K/uL    Eos (Absolute) 0.32 0.00 - 0.51 K/uL    Baso (Absolute) 0.04 0.00 - 0.12 K/uL    Immature Granulocytes (abs) 0.03 0.00 - 0.11 K/uL    NRBC (Absolute) 0.00 K/uL   Comp Metabolic Panel   Result Value Ref Range    Sodium 137 135 - 145 mmol/L    Potassium 4.0 3.6 - 5.5 mmol/L    Chloride 102 96 - 112 mmol/L    Co2 20 20 - 33 mmol/L    Anion Gap 15.0 7.0 - 16.0    Glucose 91 65 - 99 mg/dL    Bun 7 (L) 8 - 22 mg/dL    Creatinine 0.72 0.50 - 1.40 mg/dL    Calcium 9.0 8.5 - 10.5 mg/dL    AST(SGOT) 21 12 - 45 U/L    ALT(SGPT) 19 2 - 50 U/L    Alkaline Phosphatase 119 (H) 30 - 99 U/L    Total Bilirubin 0.2 0.1 - 1.5 mg/dL    Albumin 4.0 3.2 - 4.9 g/dL    Total Protein 6.8 6.0 - 8.2 g/dL    Globulin 2.8 1.9 - 3.5 g/dL    A-G Ratio 1.4 g/dL   DIAGNOSTIC ALCOHOL   Result Value Ref Range    Diagnostic Alcohol 227.0 (H) 0.0 - 10.0 mg/dL   ESTIMATED GFR   Result Value Ref Range    GFR (CKD-EPI) 99 >60 mL/min/1.73 m 2   POC BREATHALIZER   Result Value Ref Range    POC Breathalizer 0.208 (A) 0.00 - 0.01 Percent     All labs reviewed by me, significantly elevated ethanol level, otherwise unremarkable.      COURSE & MEDICAL DECISION MAKING  Pertinent Labs & Imaging studies reviewed. (See chart for details)    9:09 PM - Patient seen and examined at bedside.  Ordered metabolic work-up and behavioral health evaluation to evaluate his symptoms. The differential diagnoses include but are not limited to: Suicidal ideation, intoxication    0016: Labs unrevealing, he is intoxicated but otherwise unremarkable studies.  When he evaluation by behavioral health at this time.  Patient placed in ED observation status at this  "time pending evaluation by behavioral health.    Patient Vitals for the past 24 hrs:   BP Temp Temp src Pulse Resp SpO2 Height Weight   05/03/22 0207 -- -- -- 65 16 95 % -- --   05/02/22 2300 -- -- -- 86 16 92 % -- --   05/02/22 2048 -- -- -- -- -- -- 1.778 m (5' 10\") 81.6 kg (180 lb)   05/02/22 2045 105/70 36.4 °C (97.5 °F) Temporal 90 14 91 % -- --         Decision Making:  This is a 71 y.o. year old male who presents with significant alcohol intoxication.  Patient apparently had made suicidal statements in the outpatient setting but thankfully no evidence of self-harm.  He is intoxicated and as such is not a candidate for completion of legal hold at initial assessment.  Patient was observed in the ED until clinically sober at which time he will need to be reassessed for final disposition.    0019: Patient placed in ED observation at this time awaiting final assessment by behavioral health when he is clinically sober for final disposition      FINAL IMPRESSION  1. Alcohol abuse    2. Suicidal ideation          Luzmaria RODRIGUEZ M.D. (Scribe), am scribing for, and in the presence of, Luzmaria Horowitz MD.    Electronically signed by: Luzmaria Horowitz M.D. (Scribe), 5/2/2022    ILuzmaria MD personally performed the services described in this documentation, as scribed by Luzmaria Horowitz M.D. in my presence, and it is both accurate and complete    The note accurately reflects work and decisions made by me.  Luzmaria Horowitz M.D.  5/3/2022  12:19 AM  "

## 2022-05-04 VITALS
WEIGHT: 180 LBS | HEIGHT: 70 IN | RESPIRATION RATE: 18 BRPM | TEMPERATURE: 97.8 F | OXYGEN SATURATION: 93 % | BODY MASS INDEX: 25.77 KG/M2 | DIASTOLIC BLOOD PRESSURE: 82 MMHG | SYSTOLIC BLOOD PRESSURE: 142 MMHG | HEART RATE: 72 BPM

## 2022-05-04 PROCEDURE — A9270 NON-COVERED ITEM OR SERVICE: HCPCS | Performed by: EMERGENCY MEDICINE

## 2022-05-04 PROCEDURE — 99284 EMERGENCY DEPT VISIT MOD MDM: CPT | Performed by: PSYCHIATRY & NEUROLOGY

## 2022-05-04 PROCEDURE — A9270 NON-COVERED ITEM OR SERVICE: HCPCS | Performed by: PSYCHIATRY & NEUROLOGY

## 2022-05-04 PROCEDURE — 700102 HCHG RX REV CODE 250 W/ 637 OVERRIDE(OP): Performed by: EMERGENCY MEDICINE

## 2022-05-04 PROCEDURE — 700102 HCHG RX REV CODE 250 W/ 637 OVERRIDE(OP): Performed by: PSYCHIATRY & NEUROLOGY

## 2022-05-04 RX ORDER — LORAZEPAM 2 MG/1
2 TABLET ORAL
Status: DISCONTINUED | OUTPATIENT
Start: 2022-05-04 | End: 2022-05-04

## 2022-05-04 RX ORDER — GABAPENTIN 300 MG/1
300 CAPSULE ORAL 3 TIMES DAILY
Status: DISCONTINUED | OUTPATIENT
Start: 2022-05-04 | End: 2022-05-04 | Stop reason: HOSPADM

## 2022-05-04 RX ADMIN — HYDROXYZINE HYDROCHLORIDE 50 MG: 25 TABLET, FILM COATED ORAL at 12:29

## 2022-05-04 RX ADMIN — GABAPENTIN 300 MG: 300 CAPSULE ORAL at 12:18

## 2022-05-04 RX ADMIN — ALBUTEROL SULFATE 2 PUFF: 90 AEROSOL, METERED RESPIRATORY (INHALATION) at 12:19

## 2022-05-04 RX ADMIN — LORAZEPAM 2 MG: 2 TABLET ORAL at 04:46

## 2022-05-04 NOTE — ED NOTES
Pt medicated per MAR for DTs. Pt experiencing tremors in upper extremities. Says he has a hx of seizures with withdrawal and wishes they'd just kill him.

## 2022-05-04 NOTE — DISCHARGE PLANNING
Legal Hold Transfer     Referral: Legal hold transfer to Mental Health Facility     Intervention: Notified by JENNA Schwarz that pt has been accepted to [facility].     Pt's accepting physcian is Dr. Wilhelm     Transport arranged through [facility]     The pt will be picked up at 1300      Notified Bedside RN Xiao and Alert Team WALTER Wang of the departure time as well as accepting facility.      Transfer packet created with original legal hold and placed on chart.      Plan:  Pt will be transferring to [facility] today at 1300 via [facility].

## 2022-05-04 NOTE — DISCHARGE PLANNING
Alert Team:    Patient resting majority of the night without incidnet with 1:1 sitter observing patient safety outside of room. Patient requested Ativan medication to address withdrawal symptoms at 0445; compliant with scheduled medication regime. IP Psychiatry Consult ordered. Patient waits acceptance at a psychiatric facility. Alert team will continue to monitor.

## 2022-05-04 NOTE — ED NOTES
Pt laying in bed, eyes closed, equal chest rise noted, no signs of distress noted, sitter outside room in direct line of sight to the pt, will continue to monitor

## 2022-05-04 NOTE — ED NOTES
Pt resting quietly. nadn.   The patient called and left a message stating that she needs a refill for torsemide, however, she has no showed to her last two appts.  I attempted to call her back to ask that she schedule an appt so that I can send her in enough medicine to last until follow up, but she does not answer and no VM is available.  Should she call back I will be happy to help her with this.

## 2022-05-04 NOTE — DISCHARGE PLANNING
Alert Team Note:    Contacted [facility], spoke to Abril. Pt has been accepted. Accepting is Dr. Wilhelm. Facility expects transport at 1300.   Informed Margarita Duke.

## 2022-05-04 NOTE — ED PROVIDER NOTES
"ED Provider Note    ED Observation Progress Note    Date of Service: 05/04/22    Interval History  Patient continues to hold in the emergency department awaiting placement in inpatient psychiatric facility.  No acute events.  No acute complaints.    Physical Exam  /80   Pulse 61   Temp 36.7 °C (98.1 °F) (Temporal)   Resp 20   Ht 1.778 m (5' 10\")   Wt 81.6 kg (180 lb)   SpO2 92%   BMI 25.83 kg/m² .    Constitutional: Awake and alert. Nontoxic  HENT:  Grossly normal  Eyes: Grossly normal  Neck: Normal range of motion  Cardiovascular: Normal heart rate   Thorax & Lungs: No respiratory distress  Abdomen: Nontender  Skin:  No pathologic rash.   Extremities: Well perfused  Psychiatric: Depressed mood, flat affect.    Labs  Results for orders placed or performed during the hospital encounter of 05/02/22   CBC WITH DIFFERENTIAL   Result Value Ref Range    WBC 7.9 4.8 - 10.8 K/uL    RBC 4.28 (L) 4.70 - 6.10 M/uL    Hemoglobin 13.2 (L) 14.0 - 18.0 g/dL    Hematocrit 38.8 (L) 42.0 - 52.0 %    MCV 90.7 81.4 - 97.8 fL    MCH 30.8 27.0 - 33.0 pg    MCHC 34.0 33.7 - 35.3 g/dL    RDW 48.4 35.9 - 50.0 fL    Platelet Count 303 164 - 446 K/uL    MPV 9.3 9.0 - 12.9 fL    Neutrophils-Polys 57.40 44.00 - 72.00 %    Lymphocytes 26.10 22.00 - 41.00 %    Monocytes 11.50 0.00 - 13.40 %    Eosinophils 4.10 0.00 - 6.90 %    Basophils 0.50 0.00 - 1.80 %    Immature Granulocytes 0.40 0.00 - 0.90 %    Nucleated RBC 0.00 /100 WBC    Neutrophils (Absolute) 4.53 1.82 - 7.42 K/uL    Lymphs (Absolute) 2.06 1.00 - 4.80 K/uL    Monos (Absolute) 0.91 (H) 0.00 - 0.85 K/uL    Eos (Absolute) 0.32 0.00 - 0.51 K/uL    Baso (Absolute) 0.04 0.00 - 0.12 K/uL    Immature Granulocytes (abs) 0.03 0.00 - 0.11 K/uL    NRBC (Absolute) 0.00 K/uL   Comp Metabolic Panel   Result Value Ref Range    Sodium 137 135 - 145 mmol/L    Potassium 4.0 3.6 - 5.5 mmol/L    Chloride 102 96 - 112 mmol/L    Co2 20 20 - 33 mmol/L    Anion Gap 15.0 7.0 - 16.0    Glucose 91 " 65 - 99 mg/dL    Bun 7 (L) 8 - 22 mg/dL    Creatinine 0.72 0.50 - 1.40 mg/dL    Calcium 9.0 8.5 - 10.5 mg/dL    AST(SGOT) 21 12 - 45 U/L    ALT(SGPT) 19 2 - 50 U/L    Alkaline Phosphatase 119 (H) 30 - 99 U/L    Total Bilirubin 0.2 0.1 - 1.5 mg/dL    Albumin 4.0 3.2 - 4.9 g/dL    Total Protein 6.8 6.0 - 8.2 g/dL    Globulin 2.8 1.9 - 3.5 g/dL    A-G Ratio 1.4 g/dL   DIAGNOSTIC ALCOHOL   Result Value Ref Range    Diagnostic Alcohol 227.0 (H) 0.0 - 10.0 mg/dL   ESTIMATED GFR   Result Value Ref Range    GFR (CKD-EPI) 99 >60 mL/min/1.73 m 2   POC BREATHALIZER   Result Value Ref Range    POC Breathalizer 0.208 (A) 0.00 - 0.01 Percent   POC BREATHALIZER   Result Value Ref Range    POC Breathalizer 0.059 (A) 0.00 - 0.01 Percent       Radiology  No orders to display       Problem List  1. Alcohol abuse Active   2. Suicidal ideation Active           Electronically signed by: Popeye Sultana M.D., 5/4/2022 11:05 AM
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
## 2022-05-08 ENCOUNTER — HOSPITAL ENCOUNTER (EMERGENCY)
Facility: MEDICAL CENTER | Age: 72
End: 2022-05-08
Attending: EMERGENCY MEDICINE
Payer: COMMERCIAL

## 2022-05-08 VITALS
SYSTOLIC BLOOD PRESSURE: 121 MMHG | DIASTOLIC BLOOD PRESSURE: 70 MMHG | RESPIRATION RATE: 18 BRPM | HEIGHT: 70 IN | BODY MASS INDEX: 26.7 KG/M2 | WEIGHT: 186.51 LBS | HEART RATE: 79 BPM | OXYGEN SATURATION: 92 % | TEMPERATURE: 99.7 F

## 2022-05-08 DIAGNOSIS — F10.10 ALCOHOL ABUSE: ICD-10-CM

## 2022-05-08 PROCEDURE — 99284 EMERGENCY DEPT VISIT MOD MDM: CPT

## 2022-05-08 ASSESSMENT — FIBROSIS 4 INDEX: FIB4 SCORE: 1.1

## 2022-05-09 NOTE — ED TRIAGE NOTES
"Mohsen Adler  71 y.o.  Chief Complaint   Patient presents with   • Anxiety     +ETOH, endorses lots of life stressors at this time     Ambulatory to lobby for above, pt reports feeling suddenly anxious after drinking tonight. Pt states he had to \"lean against the wall\" due to feeling panicky. Pt reports hx TIA. A&Ox4, GCS 15. Placed back in lobby.  "

## 2022-05-09 NOTE — ED PROVIDER NOTES
"ED Provider Note    ED Provider Note    Primary care provider: Pcp Pt States None  Means of arrival: EMS  History obtained from: Patient    CHIEF COMPLAINT  Chief Complaint   Patient presents with   • Anxiety     +ETOH, endorses lots of life stressors at this time     Seen at 9:49 PM.   HPI  Mohsen Adler is a 71 y.o. male who presents to the Emergency Department telling me that he feels like he needs to pass out.  He is intoxicated, hypersomnolent and does not wish to participate in any further questioning.  History therefore is limited.    REVIEW OF SYSTEMS  See HPI,   Remainder of ROS unreliable, the patient answers yes to most questions asked.     PAST MEDICAL HISTORY   has a past medical history of Alcohol abuse and Chronic obstructive pulmonary disease (HCC).    SURGICAL HISTORY   has a past surgical history that includes other orthopedic surgery.    SOCIAL HISTORY  Social History     Tobacco Use   • Smoking status: Current Every Day Smoker     Packs/day: 1.00     Types: Cigarettes   • Smokeless tobacco: Never Used   Vaping Use   • Vaping Use: Never used   Substance Use Topics   • Alcohol use: Yes     Comment: Up to 1/2 gallon vodka in 2.5 days   • Drug use: Yes     Types: Inhaled     Comment: thc      Social History     Substance and Sexual Activity   Drug Use Yes   • Types: Inhaled    Comment: thc       FAMILY HISTORY  History reviewed. No pertinent family history.    CURRENT MEDICATIONS  Reviewed.  See Encounter Summary.     ALLERGIES  Allergies   Allergen Reactions   • Benadryl Allergy Unspecified     Pt states that it exacerbates his restless leg syndrome.    • Seroquel [Quetiapine] Unspecified     Pt states that it exacerbates his restless leg syndrome.        PHYSICAL EXAM  VITAL SIGNS: /70   Pulse 79   Temp 37.6 °C (99.7 °F)   Resp 18   Ht 1.778 m (5' 10\")   Wt 84.6 kg (186 lb 8.2 oz)   SpO2 92%   BMI 26.76 kg/m²   Constitutional: Awake, alert in no apparent distress.  Well dressed and " groomed.  Sleeping in the exam room with a hat on.  HENT: Normocephalic, Bilateral external ears normal. Nose normal.   Eyes: Conjunctiva normal, non-icteric, EOMI.    Thorax & Lungs: Easy unlabored respirations, Clear to ascultation bilaterally.  Cardiovascular: Regular rate, Regular rhythm, No murmurs, rubs or gallops. Bilateral pulses symmetrical.  Current heart rate 85.  Abdomen:  Soft, nontender, nondistended, normal active bowel sounds.   :    Skin: Visualized skin is  Dry, No erythema, No rash.   Musculoskeletal:   No cyanosis, clubbing or edema. No leg asymmetry.   Neurologic: Alert, Grossly non-focal.  Mild slurred speech.  Psychiatric: Normal affect, Normal mood  Lymphatic:  No cervical LAD        RADIOLOGY  No orders to display         COURSE & MEDICAL DECISION MAKING  Pertinent Labs & Imaging studies reviewed. (See chart for details)    Differential diagnoses include but are not limited to: Exposure, homelessness    9:49 PM - Medical record reviewed, is the patient's seventh visit to the emergency department for alcoholism, anxiety in the past 30 days.    Decision Making:  This is a pleasant 71 y.o. year old male who presents with really no chief complaint.  He states that he might pass out.  To me he appears mildly intoxicated and sleepy.  He has been in our emergency department at least 5 times in the past 30 days.  He tells me he is from [place] and is new to the area.  I explained that this is inappropriate to use the emergency department in this regards.  He will need to find some other place for shelter.  He has had thorough work-ups on his prior visits, he has been evaluated by life skills, we have sent him for inpatient detox.  I believe that he is not ready to achieve sobriety and at this time is abusing the system.    Discharge Medications:  Discharge Medication List as of 5/8/2022 10:07 PM          The patient was discharged home (see d/c instructions) was told to return immediately for  any signs or symptoms listed, or any worsening at all.  The patient verbally agreed to the discharge precautions and follow-up plan which is documented in EPIC.        FINAL IMPRESSION  1. Alcohol abuse

## 2022-05-17 ENCOUNTER — HOSPITAL ENCOUNTER (EMERGENCY)
Facility: MEDICAL CENTER | Age: 72
End: 2022-05-18
Attending: EMERGENCY MEDICINE
Payer: COMMERCIAL

## 2022-05-17 DIAGNOSIS — F43.21 SITUATIONAL DEPRESSION: ICD-10-CM

## 2022-05-17 DIAGNOSIS — F10.929 ALCOHOLIC INTOXICATION WITH COMPLICATION (HCC): ICD-10-CM

## 2022-05-17 LAB
POC BREATHALIZER: 0.1 PERCENT (ref 0–0.01)
POC BREATHALIZER: 0.12 PERCENT (ref 0–0.01)

## 2022-05-17 PROCEDURE — 302970 POC BREATHALIZER: Performed by: EMERGENCY MEDICINE

## 2022-05-17 PROCEDURE — 99284 EMERGENCY DEPT VISIT MOD MDM: CPT

## 2022-05-17 PROCEDURE — 36415 COLL VENOUS BLD VENIPUNCTURE: CPT

## 2022-05-17 ASSESSMENT — FIBROSIS 4 INDEX: FIB4 SCORE: 1.1

## 2022-05-18 VITALS
RESPIRATION RATE: 18 BRPM | HEART RATE: 92 BPM | HEIGHT: 70 IN | TEMPERATURE: 98.2 F | DIASTOLIC BLOOD PRESSURE: 62 MMHG | WEIGHT: 183 LBS | SYSTOLIC BLOOD PRESSURE: 112 MMHG | OXYGEN SATURATION: 95 % | BODY MASS INDEX: 26.2 KG/M2

## 2022-05-18 LAB
AMPHET UR QL SCN: NEGATIVE
BARBITURATES UR QL SCN: NEGATIVE
BENZODIAZ UR QL SCN: POSITIVE
BZE UR QL SCN: NEGATIVE
CANNABINOIDS UR QL SCN: NEGATIVE
METHADONE UR QL SCN: NEGATIVE
OPIATES UR QL SCN: NEGATIVE
OXYCODONE UR QL SCN: NEGATIVE
PCP UR QL SCN: NEGATIVE
POC BREATHALIZER: 0.07 PERCENT (ref 0–0.01)
PROPOXYPH UR QL SCN: NEGATIVE

## 2022-05-18 PROCEDURE — 302970 POC BREATHALIZER: Performed by: EMERGENCY MEDICINE

## 2022-05-18 PROCEDURE — 80307 DRUG TEST PRSMV CHEM ANLYZR: CPT

## 2022-05-18 NOTE — ED PROVIDER NOTES
ED Provider Note        Primary care provider: Pcp Pt States None    I verified that the patient was wearing a mask and I was wearing appropriate PPE every time I entered the room. The patient's mask was on the patient at all times during my encounter except for a brief view of the oropharynx.      CHIEF COMPLAINT  Chief Complaint   Patient presents with   • Suicidal Ideation     EMS reports patient was picked up in [place]. Pt reports having ETOH use tonight, but also reports feeling suicidal. No self harm committed.       HPI  Mohsen Adler is a 71 y.o. male who presents to the Emergency Department with chief complaint of suicidal ideation.  Patient states that he has been suicidal for the last 12 years after losing all his family members.  Patient has no plan at this time he has no homicidal ideation no audiovisual hallucinations he reports he drank 2 pints of vodka today.  He denies any other acute ingestions.  No headache no altered mental status no fevers chills cough congestion chest pain shortness of breath no problems with abdominal pain and no changes in urination or bowel movements recently.    REVIEW OF SYSTEMS  10 systems reviewed and otherwise negative, pertinent positives and negatives listed in the history of present illness.    PAST MEDICAL HISTORY   has a past medical history of Alcohol abuse and Chronic obstructive pulmonary disease (HCC).    SURGICAL HISTORY   has a past surgical history that includes other orthopedic surgery.    SOCIAL HISTORY  Social History     Tobacco Use   • Smoking status: Current Every Day Smoker     Packs/day: 1.00     Types: Cigarettes   • Smokeless tobacco: Never Used   Vaping Use   • Vaping Use: Never used   Substance Use Topics   • Alcohol use: Yes     Comment: Up to 1/2 gallon vodka in 2.5 days   • Drug use: Yes     Types: Inhaled     Comment: thc      Social History     Substance and Sexual Activity   Drug Use Yes   • Types: Inhaled    Comment: thc       FAMILY  "HISTORY  Non-Contributory    CURRENT MEDICATIONS  Home Medications    **Home medications have not yet been reviewed for this encounter**         ALLERGIES  Allergies   Allergen Reactions   • Benadryl Allergy Unspecified     Pt states that it exacerbates his restless leg syndrome.    • Seroquel [Quetiapine] Unspecified     Pt states that it exacerbates his restless leg syndrome.        PHYSICAL EXAM  VITAL SIGNS: Ht 1.778 m (5' 10\")   Wt 83 kg (183 lb)   BMI 26.26 kg/m²   Pulse ox interpretation: I interpret this pulse ox as normal.  Constitutional: Alert and oriented x 3, no acute distress  HEENT: Atraumatic normocephalic, pupils are equal round, extraocular movements are intact. The nares is clear, external ears are normal, mouth shows moist mucous membranes  Neck: no obvious JVD or tracheal deviation  Cardiovascular: Regular rate and rhythm no murmur rub or gallop   Thorax & Lungs: No respiratory distress, no wheezes rales or rhonchi, No chest tenderness.   GI: Soft nontender nondistended positive bowel sounds, no peritoneal signs   Skin: Warm dry no obvious acute rash or lesion  Musculoskeletal: Moving all extremities with normal range strength, no acute  deformity  Neurologic: Cranial nerves III through XII are grossly intact, no sensory deficit, no cerebellar dysfunction   Psychiatric: Flat affect      DIAGNOSTIC STUDIES / PROCEDURES      COURSE & MEDICAL DECISION MAKING  Pertinent Labs & Imaging studies reviewed. (See chart for details)    10:18 PM - Patient seen and examined at bedside.     Coagulation studies were ordered in light of     Patient noted to have slightly elevated blood pressure likely circumstantial secondary to presenting complaint. Referred to primary care physician for further evaluation.      Medical Decision Making: Patient's been seen and evaluated here for the same thing several times.  Patient is asking for food and water.  He is slightly intoxicated.  Patient is allowed to " "metabolize.  Patient is establishing a pattern of malingering/secondary gain.  Patient is somewhat upset when informed that he is being discharged and stated \"I thought if I said I was suicidal that you had to let me stay here\" patient is given local resources instructed to return for worsening symptoms or concerns otherwise discharged in stable and improved condition.  /62   Pulse 92   Temp 36.8 °C (98.2 °F) (Temporal)   Resp 18   Ht 1.778 m (5' 10\")   Wt 83 kg (183 lb)   SpO2 95%   BMI 26.26 kg/m²     Psychiatry Resources  As provided by [employer]  Schedule an appointment as soon as possible for a visit       [facility], Emergency Dept  [address] [address] [phone]    in 12-24 hours if symptoms persist, immediately If symptoms worsen, or if you develop any other symptoms or concerns          FINAL IMPRESSION:  1. Situational depression Active   2. Alcoholic intoxication with complication (HCC) Active    3.  Malingering      This dictation has been created using voice recognition software and/or scribes. The accuracy of the dictation is limited by the abilities of the software and the expertise of the scribes. I expect there may be some errors of grammar and possibly content. I made every attempt to manually correct the errors within my dictation. However, errors related to voice recognition software and/or scribes may still exist and should be interpreted within the appropriate context.            "

## 2022-05-18 NOTE — ED NOTES
"Pt ambulatory with steady gait at discharge. Pt awake alert and oriented. No signs of distresspt in possession of belongings. Pt provided discharge education and information pertaining to medications and follow up appointments. Pt received copy of discharge instructions and verbalized understanding. /62   Pulse 92   Temp 36.8 °C (98.2 °F) (Temporal)   Resp 18   Ht 1.778 m (5' 10\")   Wt 83 kg (183 lb)   SpO2 95%   BMI 26.26 kg/m²   "

## 2022-05-18 NOTE — ED NOTES
Pt awake and oriented. Pt reports ETOH use. Pt states he has felt suicidal for the past 10 years and is continuing to feel suicidal tonight. Pt denies self injury. Pt denies plan to kill self.

## 2023-01-31 NOTE — ED NOTES
Rounded on patient, Patient turns himself on the gurney, laying on his left side at this time.    PAST SURGICAL HISTORY:  History of surgery hernia repair    x2    History of surgery cysto bladder stone removed    History of surgery lithotripsy  x 2

## 2023-12-18 NOTE — ED NOTES
No signs of distress, Respirations unlabored and even pt stable.   Detail Level: Simple Hide Include Location In Plan Question?: No Detail Level: Generalized